# Patient Record
Sex: MALE
[De-identification: names, ages, dates, MRNs, and addresses within clinical notes are randomized per-mention and may not be internally consistent; named-entity substitution may affect disease eponyms.]

---

## 2018-05-09 ENCOUNTER — APPOINTMENT (OUTPATIENT)
Dept: CARDIOLOGY | Facility: CLINIC | Age: 78
End: 2018-05-09

## 2018-05-09 VITALS
HEART RATE: 66 BPM | BODY MASS INDEX: 44.32 KG/M2 | SYSTOLIC BLOOD PRESSURE: 113 MMHG | HEIGHT: 65 IN | OXYGEN SATURATION: 96 % | DIASTOLIC BLOOD PRESSURE: 74 MMHG | TEMPERATURE: 98.4 F | WEIGHT: 266 LBS

## 2018-05-09 DIAGNOSIS — Z87.19 PERSONAL HISTORY OF OTHER DISEASES OF THE DIGESTIVE SYSTEM: ICD-10-CM

## 2018-05-09 DIAGNOSIS — Z82.49 FAMILY HISTORY OF ISCHEMIC HEART DISEASE AND OTHER DISEASES OF THE CIRCULATORY SYSTEM: ICD-10-CM

## 2018-05-09 DIAGNOSIS — N40.0 BENIGN PROSTATIC HYPERPLASIA WITHOUT LOWER URINARY TRACT SYMPMS: ICD-10-CM

## 2018-05-09 DIAGNOSIS — E11.9 TYPE 2 DIABETES MELLITUS W/OUT COMPLICATIONS: ICD-10-CM

## 2018-05-09 DIAGNOSIS — Z87.891 PERSONAL HISTORY OF NICOTINE DEPENDENCE: ICD-10-CM

## 2018-05-09 DIAGNOSIS — Z86.39 PERSONAL HISTORY OF OTHER ENDOCRINE, NUTRITIONAL AND METABOLIC DISEASE: ICD-10-CM

## 2018-05-09 DIAGNOSIS — Z86.010 PERSONAL HISTORY OF COLONIC POLYPS: ICD-10-CM

## 2018-05-09 DIAGNOSIS — Z86.79 PERSONAL HISTORY OF OTHER DISEASES OF THE CIRCULATORY SYSTEM: ICD-10-CM

## 2018-05-18 PROBLEM — Z86.79 HISTORY OF HYPERTENSION: Status: RESOLVED | Noted: 2018-05-18 | Resolved: 2018-05-18

## 2018-05-18 PROBLEM — Z86.39 HISTORY OF OBESITY: Status: RESOLVED | Noted: 2018-05-18 | Resolved: 2018-05-18

## 2018-05-18 PROBLEM — Z86.010 HISTORY OF COLONIC POLYPS: Status: RESOLVED | Noted: 2018-05-18 | Resolved: 2018-05-18

## 2018-05-18 PROBLEM — Z87.891 FORMER SMOKER: Status: ACTIVE | Noted: 2018-05-18

## 2018-05-18 PROBLEM — Z82.49 FAMILY HISTORY OF CORONARY ARTERY DISEASE: Status: ACTIVE | Noted: 2018-05-18

## 2018-05-18 PROBLEM — E11.9 TYPE 2 DIABETES MELLITUS: Status: ACTIVE | Noted: 2018-05-18

## 2018-05-18 PROBLEM — Z82.49 FAMILY HISTORY OF VALVULAR HEART DISEASE: Status: ACTIVE | Noted: 2018-05-18

## 2018-05-18 PROBLEM — N40.0 BPH WITHOUT URINARY OBSTRUCTION: Status: RESOLVED | Noted: 2018-05-18 | Resolved: 2018-05-18

## 2018-05-18 PROBLEM — Z86.39 HISTORY OF THYROID NODULE: Status: RESOLVED | Noted: 2018-05-18 | Resolved: 2018-05-18

## 2018-05-22 ENCOUNTER — RX RENEWAL (OUTPATIENT)
Age: 78
End: 2018-05-22

## 2018-05-27 ENCOUNTER — NON-APPOINTMENT (OUTPATIENT)
Age: 78
End: 2018-05-27

## 2018-05-27 PROBLEM — Z87.19 HISTORY OF GASTRITIS: Status: RESOLVED | Noted: 2018-05-27 | Resolved: 2018-05-27

## 2018-06-25 ENCOUNTER — RX RENEWAL (OUTPATIENT)
Age: 78
End: 2018-06-25

## 2018-07-25 ENCOUNTER — RX RENEWAL (OUTPATIENT)
Age: 78
End: 2018-07-25

## 2018-10-17 ENCOUNTER — RX RENEWAL (OUTPATIENT)
Age: 78
End: 2018-10-17

## 2018-11-13 ENCOUNTER — MEDICATION RENEWAL (OUTPATIENT)
Age: 78
End: 2018-11-13

## 2018-11-16 ENCOUNTER — RX RENEWAL (OUTPATIENT)
Age: 78
End: 2018-11-16

## 2018-11-27 ENCOUNTER — RESULT REVIEW (OUTPATIENT)
Age: 78
End: 2018-11-27

## 2018-12-06 ENCOUNTER — APPOINTMENT (OUTPATIENT)
Dept: CARDIOLOGY | Facility: CLINIC | Age: 78
End: 2018-12-06

## 2018-12-19 ENCOUNTER — NON-APPOINTMENT (OUTPATIENT)
Age: 78
End: 2018-12-19

## 2018-12-19 ENCOUNTER — APPOINTMENT (OUTPATIENT)
Dept: CARDIOLOGY | Facility: CLINIC | Age: 78
End: 2018-12-19
Payer: MEDICARE

## 2018-12-19 VITALS
BODY MASS INDEX: 44.32 KG/M2 | TEMPERATURE: 98 F | WEIGHT: 266 LBS | DIASTOLIC BLOOD PRESSURE: 65 MMHG | OXYGEN SATURATION: 96 % | SYSTOLIC BLOOD PRESSURE: 149 MMHG | HEIGHT: 65 IN | HEART RATE: 70 BPM

## 2018-12-19 DIAGNOSIS — Z87.19 PERSONAL HISTORY OF OTHER DISEASES OF THE DIGESTIVE SYSTEM: ICD-10-CM

## 2018-12-19 PROCEDURE — 93000 ELECTROCARDIOGRAM COMPLETE: CPT

## 2018-12-19 PROCEDURE — 99215 OFFICE O/P EST HI 40 MIN: CPT

## 2018-12-19 NOTE — REVIEW OF SYSTEMS
[Feeling Fatigued] : feeling fatigued [Loss Of Hearing] : hearing loss [see HPI] : see HPI [Joint Pain] : joint pain [Negative] : Heme/Lymph

## 2018-12-23 NOTE — DISCUSSION/SUMMARY
[FreeTextEntry1] : Shortness of breath\par The working diagnosis is shortness of breath on exertion due to obesity and deconditioning. The history is consistent with this diagnosis. The differential diagnosis would include other possibilities.The absence of chest pain and normal 12/18 electrocardiogram argue against myocardial ischemia/atherosclerotic heart disease as a cause for the patient's symptoms. The present  physical examination and normal 11/18 chest  x ray eliminate   pulmonary venous congestion/heart failure as an explanation for symptoms of dyspnea. The likelihood pulmonary emboli in the setting of chronic anticoagulation therapy is remote.\par \par I have recommended a followup:\par 1. No further cardiac testing for this problem at this time\par 2. Low-salt low-fat low-cholesterol diabetic diet. Regular aerobic exercise and weight loss\par \par \par \par Atherosclerotic heart disease\par Atherosclerotic heart disease is stable. In 12/96 Naren underwent left circumflex PCTA. In 5/00 D1 required PCTA/stent. In 11/12 a complex RCA stenosis was addressed by rotablade PCTA/AURELIA . In  8/14 dizziness and diaphoresis led to a diagnosis of acute coronary ischemic syndrome. PCTA/AURELIA (overlapping the prior stent and extending into the ostium) was performed for a 60% RCA stenosis. The LAD had a 50% proximal mid and distal lesion. D1 80% ostial stenosis with a 75% in-stent mid lesion. Athough not described presumably the circumflex/M1 was patent. A 5/15 Lexiscan sestamibi study demonstrated only a small area of apical lateral partial ischemia. The resting 12/18 electrocardiogram shows no evidence of myocardial ischemia or infarction. Left ventricle systolic function is preserved as reflected by a left ventricle ejection fraction of 68% on the 8/14 left ventriculogram"low normal" on the 5/15 echocardiogram and 69% on the 5/15 gated sestamibi study. In view of the lack of symptoms, above noninvasive studies and clinical course continued medical management is indicated at this time.\par \par I have recommended the following: \par 1 continue present medical regimen\par 2 risk factor modification\par 3  echocardiogram with next office  evaluation in  6  months.\par \par \par Atrial fibrillation\par The working diagnosis is paroxysmal atrial fibrillation secondary to atherosclerotic-hypertensive heart disease and obesity. Paroxysmal atrial fibrillation was first detected in 12/10 with a ventricular rate of 140 /minute. Intravenous diltiazem restored sinus rhythm. A 12/11 Holter monitor was normal. An elevated "[id number]" score is indicative of an increased risk of systemic/cerebral emboli. Vitamin K antagonist therapy is indicated with a target INR of 2 - 3. Atrial fibrillation is expected to recur while taking the present medical regimen. The goal of treatment is to control the ventricular response and prevention of emboli as noted above.\par \par I have recommended the following:\par 1 continue present medical regimen\par 2 no further cardiac testing for this problem at this time\par 3 target INR 2-3 as discussed above\par \par \par Obesity \par Obesity exacerbates Mr. Maravilla' cardiovascular issues. Today Naren  is 5 foot 5 inches tall and weighs 266 pounds. Diet exercise and weight loss are advised.

## 2018-12-23 NOTE — PHYSICAL EXAM
[Heart Rate And Rhythm] : heart rate and rhythm were normal [Heart Sounds] : normal S1 and S2 [Murmurs] : no murmurs present [Edema] : no peripheral edema present [Bowel Sounds] : normal bowel sounds [Abdomen Soft] : soft [Abdomen Tenderness] : non-tender [Abnormal Walk] : normal gait [Cyanosis, Localized] : no localized cyanosis [] : no rash [Affect] : the affect was normal [Auscultation Breath Sounds / Voice Sounds] : lungs were clear to auscultation bilaterally [FreeTextEntry1] : pleasant

## 2018-12-23 NOTE — HISTORY OF PRESENT ILLNESS
[FreeTextEntry1] : 78-year-old man\par Routine followup\par  Naren complained of shortness of breath on exertion. The symptoms are not progressive or severe. An 11/18 chest x-ray was normal. No chest pain. No orthopnea. No ankle edema.\par \par Mr. Maravilla is accompanied today by his wife

## 2019-03-25 ENCOUNTER — RX RENEWAL (OUTPATIENT)
Age: 79
End: 2019-03-25

## 2019-05-07 ENCOUNTER — RX RENEWAL (OUTPATIENT)
Age: 79
End: 2019-05-07

## 2019-07-01 ENCOUNTER — RX RENEWAL (OUTPATIENT)
Age: 79
End: 2019-07-01

## 2019-07-16 ENCOUNTER — APPOINTMENT (OUTPATIENT)
Dept: CARDIOLOGY | Facility: CLINIC | Age: 79
End: 2019-07-16
Payer: MEDICARE

## 2019-07-16 PROCEDURE — 93306 TTE W/DOPPLER COMPLETE: CPT

## 2019-07-23 ENCOUNTER — APPOINTMENT (OUTPATIENT)
Dept: CARDIOLOGY | Facility: CLINIC | Age: 79
End: 2019-07-23
Payer: MEDICARE

## 2019-07-23 VITALS
OXYGEN SATURATION: 96 % | BODY MASS INDEX: 44.43 KG/M2 | HEART RATE: 63 BPM | SYSTOLIC BLOOD PRESSURE: 140 MMHG | DIASTOLIC BLOOD PRESSURE: 62 MMHG | WEIGHT: 267 LBS

## 2019-07-23 LAB — INR PPP: 1.3 RATIO

## 2019-07-23 PROCEDURE — 85610 PROTHROMBIN TIME: CPT | Mod: QW

## 2019-07-23 PROCEDURE — 93000 ELECTROCARDIOGRAM COMPLETE: CPT

## 2019-07-23 PROCEDURE — 99215 OFFICE O/P EST HI 40 MIN: CPT

## 2019-07-26 ENCOUNTER — NON-APPOINTMENT (OUTPATIENT)
Age: 79
End: 2019-07-26
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
## 2019-07-26 NOTE — PHYSICAL EXAM
[Auscultation Breath Sounds / Voice Sounds] : lungs were clear to auscultation bilaterally [Heart Rate And Rhythm] : heart rate and rhythm were normal [Heart Sounds] : normal S1 and S2 [Murmurs] : no murmurs present [Edema] : no peripheral edema present [Bowel Sounds] : normal bowel sounds [Abdomen Soft] : soft [Abdomen Tenderness] : non-tender [Abnormal Walk] : normal gait [Cyanosis, Localized] : no localized cyanosis [] : no rash [Affect] : the affect was normal [FreeTextEntry1] : pleasant

## 2019-07-26 NOTE — DISCUSSION/SUMMARY
[FreeTextEntry1] : Shortness of breath\par The working diagnosis is shortness of breath on exertion due to obesity and deconditioning. The history is consistent with this diagnosis. The differential diagnosis would include other possibilities.The absence of chest pain and normal 7/19  electrocardiogram argue against myocardial ischemia/atherosclerotic heart disease as a cause for the patient's symptoms. The present  physical examination and normal 11/18 chest  x ray eliminate   pulmonary venous congestion/heart failure as an explanation for symptoms of dyspnea. The likelihood pulmonary emboli in the setting of chronic anticoagulation therapy is remote.\par \par I have recommended a followup:\par 1. No further cardiac testing for this problem at this time\par 2. Low-salt low-fat low-cholesterol diabetic diet. Regular aerobic exercise and weight loss\par \par \par \par Atherosclerotic heart disease\par Atherosclerotic heart disease is stable. In 12/96 Naren underwent left circumflex PCTA. In 5/00 D1 required PCTA/stent. In 11/12 a complex RCA stenosis was addressed by rotablade PCTA/AURELIA . In  8/14 dizziness and diaphoresis led to a diagnosis of acute coronary ischemic syndrome. PCTA/AURELIA (overlapping the prior stent and extending into the ostium) was performed for a 60% RCA stenosis. The LAD had a 50% proximal mid and distal lesion. D1 80% ostial stenosis with a 75% in-stent mid lesion. Athough not described presumably the circumflex/M1 was patent. A 5/15 Lexiscan sestamibi study demonstrated only a small area of apical lateral partial ischemia. The resting 7/19  electrocardiogram is normal with no evidence of myocardial ischemia or infarction. Left ventricle systolic function is preserved as reflected by a left ventricle ejection fraction of 68% on the 8/14 left ventriculogram 69% on the 5/15 gated sestamibi study 60- 65% on the 7/19 echocardiogram. In view of the lack of symptoms, above noninvasive studies and clinical course continued medical management is indicated at this time.\par \par I have recommended the following: \par 1 continue present medical regimen\par 2 risk factor modification\par 3  No further cardiac testing for this problem at this time\par \par \par Atrial fibrillation\par The working diagnosis is paroxysmal atrial fibrillation secondary to atherosclerotic-hypertensive heart disease and obesity. Paroxysmal atrial fibrillation was first detected in 12/10 with a ventricular rate of 140 /minute. Intravenous diltiazem restored sinus rhythm. A 12/11 Holter monitor was normal. An elevated "[id number]" score is indicative of an increased risk of systemic/cerebral emboli. Vitamin K antagonist therapy is indicated with a target INR of 2 - 3. Atrial fibrillation is expected to recur while taking the present medical regimen. The goal of treatment is to control the ventricular response and prevention of emboli as noted above.\par \par I have recommended the following:\par 1 continue present medical regimen\par 2 no further cardiac testing for this problem at this time\par 3 target INR 2-3 as discussed above\par \par Hypertension\par Hypertension has reportedly been controlled on the present medical regimen. In the setting of diabetes, atrial fibrillation and atherosclerotic heart disease ACE-I and beta blocker therapy are attractive. Nonpharmacological therapy specifically diet exercise and weight loss are emphasized as major aspects of treatment.\par \par I have recommended the following:\par 1 low-salt low-fat low-cholesterol diabetic diet. Regular aerobic exercise and weight loss\par 2 continue the present medical regimen\par \par Hyperlipidemia\par Hyperlipidemia represents a risk factor for progressive atherosclerotic heart disease. A target LDL level with known atherosclerotic heart disease is about 70. HMG-CoA reductase inhibitor therapy has been effective. In 3/18 the serum cholesterol level was 116 triglycerides 147 LDL 43 and HDL 41. More recent lipid levels are not available for review. Non-pharmacological therapy, specifically diet exercise and weight loss are emphasized as major aspects of treatment.\par \par I have recommended the following:\par 1 low-salt low-fat low-cholesterol diet. Regular aerobic exercise and weight loss\par 2 continue the present medical regimen\par 3 routine laboratory studies including lipid profile through primary care and endocrinology\par 4 target LDL level to about 70 as discussed above.\par \par \par Obesity \par Obesity exacerbates Mr. Maravilla' cardiovascular issues. Today Naren  is 5 foot 5 inches tall and weighs 267 pounds. Diet exercise and weight loss are advised.

## 2019-08-25 ENCOUNTER — RX RENEWAL (OUTPATIENT)
Age: 79
End: 2019-08-25

## 2019-10-07 ENCOUNTER — RX RENEWAL (OUTPATIENT)
Age: 79
End: 2019-10-07

## 2020-02-10 ENCOUNTER — RX RENEWAL (OUTPATIENT)
Age: 80
End: 2020-02-10

## 2020-02-10 DIAGNOSIS — N40.0 BENIGN PROSTATIC HYPERPLASIA WITHOUT LOWER URINARY TRACT SYMPMS: ICD-10-CM

## 2020-03-17 ENCOUNTER — APPOINTMENT (OUTPATIENT)
Dept: CARDIOLOGY | Facility: CLINIC | Age: 80
End: 2020-03-17

## 2020-04-28 ENCOUNTER — APPOINTMENT (OUTPATIENT)
Dept: CARDIOLOGY | Facility: CLINIC | Age: 80
End: 2020-04-28

## 2020-07-20 ENCOUNTER — RX RENEWAL (OUTPATIENT)
Age: 80
End: 2020-07-20

## 2020-10-13 ENCOUNTER — RX RENEWAL (OUTPATIENT)
Age: 80
End: 2020-10-13

## 2020-10-22 ENCOUNTER — APPOINTMENT (OUTPATIENT)
Dept: CARDIOLOGY | Facility: CLINIC | Age: 80
End: 2020-10-22

## 2020-10-30 ENCOUNTER — NON-APPOINTMENT (OUTPATIENT)
Age: 80
End: 2020-10-30

## 2020-10-30 ENCOUNTER — APPOINTMENT (OUTPATIENT)
Dept: CARDIOLOGY | Facility: CLINIC | Age: 80
End: 2020-10-30
Payer: MEDICARE

## 2020-10-30 VITALS
OXYGEN SATURATION: 98 % | SYSTOLIC BLOOD PRESSURE: 118 MMHG | BODY MASS INDEX: 45.26 KG/M2 | TEMPERATURE: 98.1 F | HEART RATE: 77 BPM | WEIGHT: 272 LBS | DIASTOLIC BLOOD PRESSURE: 60 MMHG

## 2020-10-30 LAB — INR PPP: 2 RATIO

## 2020-10-30 PROCEDURE — 85610 PROTHROMBIN TIME: CPT | Mod: QW

## 2020-10-30 PROCEDURE — 93000 ELECTROCARDIOGRAM COMPLETE: CPT

## 2020-10-30 PROCEDURE — 99215 OFFICE O/P EST HI 40 MIN: CPT

## 2020-10-30 NOTE — DISCUSSION/SUMMARY
[FreeTextEntry1] : Shortness of breath\par The working diagnosis is shortness of breath on exertion due to obesity and deconditioning. The history is consistent with this diagnosis. The differential diagnosis would include other possibilities.The absence of chest pain and normal  10/20  electrocardiogram argue against myocardial ischemia/atherosclerotic heart disease as a cause for the patient's symptoms. The present  physical examination argues against   pulmonary venous congestion/heart failure as an explanation for symptoms of dyspnea.  Left ventricular systolic function is normal as reflected by left ventricular ejection fraction of 60-65% on the 7/19 echocardiogram  The likelihood pulmonary emboli in the setting of chronic anticoagulation therapy is remote.\par \par I have recommended a followup:\par 1. No further cardiac testing for this problem at this time\par 2. Low-salt low-fat low-cholesterol diabetic diet. Regular aerobic exercise and weight loss\par 3. Most recent medical/hospital records  x ray reports , etc not available at this time are requested for review\par \par \par \par Atherosclerotic heart disease\par Atherosclerotic heart disease is stable. In 12/96 Naren underwent left circumflex PCTA. In 5/00 D1 required PCTA/stent. In 11/12 a complex RCA stenosis was addressed by rotablade PCTA/AURELIA . In  8/14 dizziness and diaphoresis led to a diagnosis of acute coronary ischemic syndrome. PCTA/AURELIA (overlapping the prior stent and extending into the ostium) was performed for a 60% RCA stenosis. The LAD had a 50% proximal mid and distal lesion. D1 80% ostial stenosis with a 75% in-stent mid lesion. Athough not described presumably the circumflex/M1 was patent. A 5/15 Lexiscan sestamibi study demonstrated only a small area of apical lateral partial ischemia. The resting 10/20   electrocardiogram is normal with no evidence of myocardial ischemia or infarction. Left ventricle systolic function is preserved as reflected by a left ventricle ejection fraction of 68% on the 8/14 left ventriculogram 69% on the 5/15 gated sestamibi study 60- 65% on the 7/19 echocardiogram. In view of the lack of symptoms, above noninvasive studies and clinical course continued medical management is indicated at this time.\par \par I have recommended the following: \par 1 continue present medical regimen\par 2 risk factor modification\par 3  No further cardiac testing for this problem at this time\par \par \par Atrial fibrillation\par The working diagnosis is paroxysmal atrial fibrillation secondary to atherosclerotic-hypertensive heart disease and obesity. Paroxysmal atrial fibrillation was first detected in 12/10 with a ventricular rate of 140 /minute. Intravenous diltiazem restored sinus rhythm. A 12/11 Holter monitor was normal. An elevated "[id number]" score is indicative of an increased risk of systemic/cerebral emboli. Vitamin K antagonist therapy is indicated with a target INR of 2 - 3. Atrial fibrillation is expected to recur while taking the present medical regimen. The goal of treatment is to control the ventricular response and prevention of emboli as noted above.\par \par I have recommended the following:\par 1 continue present medical regimen\par 2 no further cardiac testing for this problem at this time\par 3 target INR 2-3 as discussed above\par \par \par \par \par Hypertension\par Hypertension has  been controlled on the present medical regimen. In the setting of diabetes, atrial fibrillation and atherosclerotic heart disease ACE-I and beta blocker therapy are attractive. Nonpharmacological therapy specifically diet exercise and weight loss are emphasized as major aspects of treatment.\par \par I have recommended the following:\par 1 low-salt low-fat low-cholesterol diabetic diet. Regular aerobic exercise and weight loss\par 2 continue the present medical regimen\par \par \par \par \par \par Hyperlipidemia\par Hyperlipidemia represents a risk factor for progressive atherosclerotic heart disease. A target LDL level with known atherosclerotic heart disease is about 70. HMG-CoA reductase inhibitor therapy has been effective. In 3/18 the serum cholesterol level was 116 triglycerides 147 LDL 43 and HDL 41. More recent lipid levels are not available for review. Non-pharmacological therapy, specifically diet exercise and weight loss are emphasized as major aspects of treatment.\par \par I have recommended the following:\par 1 low-salt low-fat low-cholesterol diet. Regular aerobic exercise and weight loss\par 2 continue the present medical regimen\par 3 routine laboratory studies including lipid profile through primary care and endocrinology\par 4 target LDL level to about 70 as discussed above.\par \par \par Obesity \par Obesity exacerbates Mr. Maravilla' cardiovascular issues. Today Naren  is 5 foot 5 inches tall and weighs 272 pounds.  He has gained 5 pounds in the last 15 months  Diet exercise and weight loss are advised.\par \par \par \par The diagnosis, prognosis, risks options and alternatives were explained at length to the patient. All questions are answered. Issues discussed included atherosclerotic heart disease, atrial fibrillation anticoagulation shortness of breath oxygen levels noninvasive cardiac testing diet and exercise/weight loss.\par \par \par

## 2020-10-30 NOTE — HISTORY OF PRESENT ILLNESS
[FreeTextEntry1] : 80-year-old man\par Unanticipated visit\par \sommer Sneed reports being hospitalized at [facility] for a urinary tract infection. He was in requirements for supplemental oxygen during the hospitalization for unclear reasons.  The infection was successfully treated with antibiotics. The details of the hospitalization/medical records are not available at this time for review.\par \par \par Mr. Maravilla experiences dyspnea on exertion similar to that which has occurred in the past. He denies chest pain ankle edema orthopnea palpitations or syncope.\par \sommer Sneed is accompanied today by his wife.

## 2020-11-08 DIAGNOSIS — Z87.440 PERSONAL HISTORY OF URINARY (TRACT) INFECTIONS: ICD-10-CM

## 2021-01-04 ENCOUNTER — APPOINTMENT (OUTPATIENT)
Dept: CARDIOLOGY | Facility: CLINIC | Age: 81
End: 2021-01-04
Payer: MEDICARE

## 2021-01-04 PROCEDURE — 99441: CPT | Mod: 95

## 2021-01-21 ENCOUNTER — APPOINTMENT (OUTPATIENT)
Dept: CARDIOLOGY | Facility: CLINIC | Age: 81
End: 2021-01-21
Payer: MEDICARE

## 2021-01-21 LAB — INR PPP: 3.1 RATIO

## 2021-01-21 PROCEDURE — 85610 PROTHROMBIN TIME: CPT | Mod: QW

## 2021-01-22 ENCOUNTER — APPOINTMENT (OUTPATIENT)
Dept: CARDIOLOGY | Facility: CLINIC | Age: 81
End: 2021-01-22

## 2021-01-26 ENCOUNTER — RX RENEWAL (OUTPATIENT)
Age: 81
End: 2021-01-26

## 2021-02-01 ENCOUNTER — APPOINTMENT (OUTPATIENT)
Dept: CARDIOLOGY | Facility: CLINIC | Age: 81
End: 2021-02-01

## 2021-02-28 RX ORDER — CHOLECALCIFEROL (VITAMIN D3) 25 MCG
TABLET ORAL
Refills: 0 | Status: ACTIVE | COMMUNITY

## 2021-03-03 ENCOUNTER — NON-APPOINTMENT (OUTPATIENT)
Age: 81
End: 2021-03-03

## 2021-03-03 ENCOUNTER — APPOINTMENT (OUTPATIENT)
Dept: CARDIOLOGY | Facility: CLINIC | Age: 81
End: 2021-03-03
Payer: MEDICARE

## 2021-03-03 VITALS
OXYGEN SATURATION: 99 % | DIASTOLIC BLOOD PRESSURE: 70 MMHG | SYSTOLIC BLOOD PRESSURE: 119 MMHG | HEART RATE: 77 BPM | TEMPERATURE: 97.7 F | WEIGHT: 252.38 LBS | BODY MASS INDEX: 42 KG/M2

## 2021-03-03 DIAGNOSIS — Z86.39 PERSONAL HISTORY OF OTHER ENDOCRINE, NUTRITIONAL AND METABOLIC DISEASE: ICD-10-CM

## 2021-03-03 DIAGNOSIS — U07.1 COVID-19: ICD-10-CM

## 2021-03-03 LAB — INR PPP: 1.8 RATIO

## 2021-03-03 PROCEDURE — 93000 ELECTROCARDIOGRAM COMPLETE: CPT | Mod: 59

## 2021-03-03 PROCEDURE — 85610 PROTHROMBIN TIME: CPT | Mod: QW

## 2021-03-03 PROCEDURE — 93246 EXT ECG>7D<15D RECORDING: CPT

## 2021-03-03 PROCEDURE — 99215 OFFICE O/P EST HI 40 MIN: CPT | Mod: 25

## 2021-03-05 PROBLEM — U07.1 COVID-19 VIRUS INFECTION: Status: RESOLVED | Noted: 2021-03-05 | Resolved: 2021-03-05

## 2021-03-06 NOTE — PHYSICAL EXAM
[Auscultation Breath Sounds / Voice Sounds] : lungs were clear to auscultation bilaterally [Heart Rate And Rhythm] : heart rate and rhythm were normal [Heart Sounds] : normal S1 and S2 [Murmurs] : no murmurs present [Bowel Sounds] : normal bowel sounds [Abdomen Soft] : soft [Abdomen Tenderness] : non-tender [Abnormal Walk] : normal gait [Cyanosis, Localized] : no localized cyanosis [] : no rash [Affect] : the affect was normal [FreeTextEntry1] : pleasant

## 2021-03-06 NOTE — DISCUSSION/SUMMARY
[FreeTextEntry1] : Atherosclerotic heart disease\par Atherosclerotic heart disease is stable. In 12/96 Naren underwent left circumflex PCTA. In 5/00 D1 required PCTA/stent. In 11/12 a complex RCA stenosis was addressed by rotablade PCTA/AURELIA . In  8/14 dizziness and diaphoresis led to a diagnosis of acute coronary ischemic syndrome. PCTA/AURELIA (overlapping the prior stent and extending into the ostium) was performed for a 60% RCA stenosis. The LAD had a 50% proximal mid and distal lesion. D1 80% ostial stenosis with a 75% in-stent mid lesion. \par \par In in 2/21 he presented with atrial fibrillation and a rapid ventricular response 120/minute. He was diagnosed as having a non-Q wave myocardial infarction with mild elevation of troponin levels\par ( troponin 0.36 -  0.3 -  0.13.)  Coronary arteriography showed  LAD nonobstructive. D2 80% mid stenosis. Circumflex small nonobstructive mild luminal irregularities. RCA 90% in-stent restenosis However flow measurements demonstrated adequate coronary blood flow.   No revascularization procedure was performed.\par \par  The resting  3/21   electrocardiogram is normal with no evidence of myocardial ischemia or infarction. Left ventricle systolic function is preserved as reflected by a left ventricle ejection fraction of 68% on the 8/14 left ventriculogram 69% on the 5/15 gated sestamibi study 60% on the 2/21  echocardiogram.\par \par I have recommended the following: \par 1  risk factor modification\par 2  The 2 [facility] coronary angiographic studies are requested for formal review. \par \par \par \par Atrial fibrillation\par The working diagnosis is paroxysmal atrial fibrillation secondary to atherosclerotic-hypertensive heart disease and obesity. Paroxysmal atrial fibrillation was first detected in 12/10 with a ventricular rate of 140 /minute. Intravenous diltiazem restored sinus rhythm.\par \par In 2/21 atrial fibrillation with a rapid ventricular response lead to hospitalization at [facility]. Increasing AV alessandro blocking agents including metoprolol succinate 200 mg b.i.d. and diltiazem 120 mg/day were required to control the ventricular response. Atenolol may provide more effective AV alessandro blockade than  metoprolol. The dose of diltiazem may be increased if required to control the ventricular response.\par n elevated "[id number]" score is indicative of an increased risk of systemic/cerebral emboli. Vitamin K antagonist therapy is indicated with a target INR of 2 - 3.. Alternatively factor Xa inhibitor therapy may replace warfarin\par   The goal of treatment is to control the ventricular response and prevention of emboli as noted above.\par \par I have recommended the following:\par 1 Discontinue metoprolol succinate\par 2. Atenolol 50 mg b.i.d.\par 3  Warfarin dose adjustment as required to  target INR 2-3 as discussed above\par 4. Zio patch /mobile telemetry  2 week study \par \par \par \par \par Hypertension\par Hypertension has  been controlled on the present medical regimen. In the setting of diabetes, atrial fibrillation and atherosclerotic heart disease ACE-I and beta blocker therapy are attractive. Nonpharmacological therapy specifically diet exercise and weight loss are emphasized as major aspects of treatment.\par \par I have recommended the following:\par 1 low-salt low-fat low-cholesterol diabetic diet. Regular aerobic exercise and weight loss\par 2 Restart ramipril 10 mg/day with monitoring of electrolytes and renal function\par \par \par \par \par \par Hyperlipidemia\par Hyperlipidemia represents a risk factor for progressive atherosclerotic heart disease. A target LDL level with known atherosclerotic heart disease is about 70. HMG-CoA reductase inhibitor therapy has been effective. In 2/21  the serum cholesterol level was 100  triglycerides 131  LDL 40 and HDL 34 . Non-pharmacological therapy, specifically diet exercise and weight loss are emphasized as major aspects of treatment.\par \par I have recommended the following:\par 1 low-salt low-fat low-cholesterol diet. Regular aerobic exercise and weight loss\par 2 continue the present medical regimen\par 3 routine laboratory studies including lipid profile through primary care and endocrinology\par 4 target LDL level to about 70 as discussed above.\par \par \par Obesity \par Obesity exacerbates Mr. Maravilla' cardiovascular issues. Today Naren  is 5 foot 5 inches tall and weighs 252 pounds.  He has lost 20 pounds in the last 5 months. Continued diet exercise and weight loss are advised.\par \par \par \par The diagnosis, prognosis, risks options and alternatives were explained at length to the patient. All questions are answered. Issues discussed included atherosclerotic heart disease, atrial fibrillation anticoagulation shortness of breath ,  coronary arteriographic findings revascularization therapy noninvasive cardiac testing diet exercise and weight loss .\par \par \par Counseling and/or coordination of care\par Time was a significant factor for this patient encounter. Total time spent with the patient and family was 40 minutes. 50% of the time was devoted to counseling and/or coordination of care \par \par

## 2021-03-06 NOTE — HISTORY OF PRESENT ILLNESS
[FreeTextEntry1] : 80-year-old man\par Routine followup out  of hospital\par \par \sommer Sneed was hospitalized at [facility] with atrial fibrillation and a rapid ventricular response. Mild elevation of the troponin level lead to coronary arteriography. 2 diagnostic angiographic studies were performed. No intervention/revascularization was felt to be required. (See hospital records/angiographic reports.)  He is questioning why no angioplasty/stent was  not  performed\par \par Presently Mr. Maravilla complains of dyspnea on minimal exertion. No chest pain. No palpitations. No syncope.\par \sommer Sneed is accompanied today by his daughter.\par

## 2021-03-17 ENCOUNTER — APPOINTMENT (OUTPATIENT)
Dept: CARDIOLOGY | Facility: CLINIC | Age: 81
End: 2021-03-17
Payer: MEDICARE

## 2021-03-17 LAB — INR PPP: 2.7 RATIO

## 2021-03-17 PROCEDURE — 85610 PROTHROMBIN TIME: CPT | Mod: QW

## 2021-03-24 ENCOUNTER — APPOINTMENT (OUTPATIENT)
Dept: CARDIOLOGY | Facility: CLINIC | Age: 81
End: 2021-03-24
Payer: MEDICARE

## 2021-03-24 PROCEDURE — 99441: CPT | Mod: 95

## 2021-03-30 ENCOUNTER — APPOINTMENT (OUTPATIENT)
Dept: CARDIOLOGY | Facility: CLINIC | Age: 81
End: 2021-03-30
Payer: MEDICARE

## 2021-03-30 LAB — INR PPP: 1.9 RATIO

## 2021-03-30 PROCEDURE — 36415 COLL VENOUS BLD VENIPUNCTURE: CPT

## 2021-03-30 PROCEDURE — 85610 PROTHROMBIN TIME: CPT | Mod: QW

## 2021-03-30 PROCEDURE — 93248 EXT ECG>7D<15D REV&INTERPJ: CPT

## 2021-03-31 LAB
ANION GAP SERPL CALC-SCNC: 15 MMOL/L
BASOPHILS # BLD AUTO: 0.06 K/UL
BASOPHILS NFR BLD AUTO: 1 %
BUN SERPL-MCNC: 33 MG/DL
CALCIUM SERPL-MCNC: 9 MG/DL
CHLORIDE SERPL-SCNC: 103 MMOL/L
CHOLEST SERPL-MCNC: 121 MG/DL
CO2 SERPL-SCNC: 24 MMOL/L
CREAT SERPL-MCNC: 1.33 MG/DL
EOSINOPHIL # BLD AUTO: 0.35 K/UL
EOSINOPHIL NFR BLD AUTO: 5.7 %
GLUCOSE SERPL-MCNC: 121 MG/DL
HCT VFR BLD CALC: 38.6 %
HDLC SERPL-MCNC: 44 MG/DL
HGB BLD-MCNC: 11.4 G/DL
IMM GRANULOCYTES NFR BLD AUTO: 0.3 %
INR PPP: 1.98 RATIO
LDLC SERPL CALC-MCNC: 47 MG/DL
LYMPHOCYTES # BLD AUTO: 1.7 K/UL
LYMPHOCYTES NFR BLD AUTO: 27.8 %
MAN DIFF?: NORMAL
MCHC RBC-ENTMCNC: 27.5 PG
MCHC RBC-ENTMCNC: 29.5 GM/DL
MCV RBC AUTO: 93 FL
MONOCYTES # BLD AUTO: 0.58 K/UL
MONOCYTES NFR BLD AUTO: 9.5 %
NEUTROPHILS # BLD AUTO: 3.41 K/UL
NEUTROPHILS NFR BLD AUTO: 55.7 %
NONHDLC SERPL-MCNC: 77 MG/DL
PLATELET # BLD AUTO: 262 K/UL
POTASSIUM SERPL-SCNC: 4.5 MMOL/L
PT BLD: 22.6 SEC
RBC # BLD: 4.15 M/UL
RBC # FLD: 15.5 %
SODIUM SERPL-SCNC: 141 MMOL/L
TRIGL SERPL-MCNC: 150 MG/DL
WBC # FLD AUTO: 6.12 K/UL

## 2021-04-07 ENCOUNTER — APPOINTMENT (OUTPATIENT)
Dept: CARDIOLOGY | Facility: CLINIC | Age: 81
End: 2021-04-07
Payer: MEDICARE

## 2021-04-07 VITALS
TEMPERATURE: 97.9 F | BODY MASS INDEX: 42.93 KG/M2 | HEART RATE: 68 BPM | OXYGEN SATURATION: 98 % | DIASTOLIC BLOOD PRESSURE: 50 MMHG | WEIGHT: 258 LBS | SYSTOLIC BLOOD PRESSURE: 90 MMHG

## 2021-04-07 DIAGNOSIS — R80.9 PROTEINURIA, UNSPECIFIED: ICD-10-CM

## 2021-04-07 PROCEDURE — 99215 OFFICE O/P EST HI 40 MIN: CPT

## 2021-04-10 PROBLEM — R80.9 PROTEINURIA: Status: RESOLVED | Noted: 2018-05-18 | Resolved: 2021-04-10

## 2021-04-10 NOTE — HISTORY OF PRESENT ILLNESS
[FreeTextEntry1] : 81-year-old man\par Routine followup\par "I feel okay."  Naren denies chest pain, shortness of breath, palpitations or syncope. He is accompanied today by his wife

## 2021-04-10 NOTE — DISCUSSION/SUMMARY
[FreeTextEntry1] : Atherosclerotic heart disease\par Atherosclerotic heart disease is stable. In 12/96 Naren underwent left circumflex PCTA. In 5/00 D1 required PCTA/stent. In 11/12 a complex RCA stenosis was addressed by rotablade PCTA/AURELIA . In  8/14 dizziness and diaphoresis led to a diagnosis of acute coronary ischemic syndrome. PCTA/AURELIA (overlapping the prior stent and extending into the ostium) was performed for a 60% RCA stenosis. The LAD had a 50% proximal mid and distal lesion. D1 80% ostial stenosis with a 75% in-stent mid lesion. \par \par In in 2/21 he presented with atrial fibrillation and a rapid ventricular response 120/minute. He was diagnosed as having a non-Q wave myocardial infarction with mild elevation of troponin levels\par ( troponin 0.36 -  0.3 -  0.13.)  Coronary arteriography showed  LAD nonobstructive. D2 80% mid stenosis. Circumflex small nonobstructive mild luminal irregularities. RCA 90% in-stent restenosis However flow measurements demonstrated adequate coronary blood flow.   No revascularization procedure was performed.\par \par  The resting  3/21   electrocardiogram is normal with no evidence of myocardial ischemia or infarction. Left ventricle systolic function is preserved as reflected by a left ventricle ejection fraction of 68% on the 8/14 left ventriculogram 69% on the 5/15 gated sestamibi study 60% on the 2/21  echocardiogram.\par \par I have recommended the following: \par 1  risk factor modification\par 2  .Continue the present medical regimen\par 3  No further cardiac testing  for this problem at this time\par 4. anticipate echocardiographic study later 2021 or  2022\par \par \par \par Atrial fibrillation\par The working diagnosis is paroxysmal atrial fibrillation secondary to atherosclerotic-hypertensive heart disease and obesity. Paroxysmal atrial fibrillation was first detected in 12/10 with a ventricular rate of 140 /minute. Intravenous diltiazem restored sinus rhythm.\par \par In 2/21 atrial fibrillation with a rapid ventricular response lead to hospitalization at [facility]. Increasing AV alessandro blocking agents including metoprolol succinate 200 mg b.i.d. and diltiazem 120 mg/day were required to control the ventricular response. .In 3/21 atenolol replaced metoprolol.\par  A 3/21  Zio patch 2 week mobile telemetry setting while taking atenolol 50 mg b.i.d. and diltiazem  mg/day revealed sinus rhythm with rate variation /minute. No arrhythmia was noted.\par  The dose of diltiazem may be increased if required to control the ventricular response.\par \par An elevated "[id number]" score is indicative of an increased risk of systemic/cerebral emboli. Vitamin K antagonist therapy is indicated with a target INR of 2 - 3.. Alternatively factor Xa inhibitor therapy may replace warfarin but is influenced by cost\par   The goal of treatment is to control the ventricular response and prevention of emboli as noted above.\par \par I have recommended the following:\par 1 .Continue the present medical regimen\par 2  Warfarin dose adjustment as required to  target INR 2-3 as discussed above\par 3  No further cardiac testing for this problem at this time\par 4  Consideration for apixaban to replace warfarin as discussed above\par \par \par \par \par Hypertension\par Hypertension has  been controlled on the present medical regimen. In the setting of diabetes, atrial fibrillation and atherosclerotic heart disease ACE-I and beta blocker therapy are attractive. Nonpharmacological therapy specifically diet exercise and weight loss are emphasized as major aspects of treatment.\par \par I have recommended the following:\par 1 low-salt low-fat low-cholesterol diabetic diet. Regular aerobic exercise and weight loss\par 2 Continue  the present medical regimen\par \par \par \par \par \par Hyperlipidemia\par Hyperlipidemia represents a risk factor for progressive atherosclerotic heart disease. A target LDL level with known atherosclerotic heart disease is about 70. HMG-CoA reductase inhibitor therapy has been effective. In  3/21   the serum cholesterol level was 121   triglycerides 150   LDL 47  and HDL 44 .\par Consideration  will be given towards a reduction in dose of rosuvastatin dependent on followup lipid levels  Non-pharmacological therapy, specifically diet exercise and weight loss are emphasized as major aspects of treatment.\par \par I have recommended the following:\par 1 low-salt low-fat low-cholesterol diet. Regular aerobic exercise and weight loss\par 2 continue the present medical regimen\par 3 routine laboratory studies including lipid profile through primary care and endocrinology\par 4 target LDL level to about 70 as discussed above.\par 5 Consideration for a reduction in the dose of rosuvastatin  dependent on followup lipid levels as discussed above\par \par Obesity \par Obesity exacerbates Mr. Maravilla' cardiovascular issues. Today Naren  is 5 foot 5 inches tall and weighs 2528 pounds.  He has gained 6 pounds in the last 6 weeks. Diet exercise and weight loss are advised.\par \par \par \par The diagnosis, prognosis, risks options and alternatives were explained at length to the patient and family. All questions are answered. Issues discussed included atherosclerotic heart disease, atrial fibrillation anticoagulation shortness of breath ,  coronary arteriographic findings revascularization therapy noninvasive cardiac testing diet exercise and weight loss .\par \par \par Counseling and/or coordination of care\par Time was a significant factor for this patient encounter. Total time spent with the patient and family was 40 minutes. 50% of the time was devoted to counseling and/or coordination of care \par \par

## 2021-04-21 ENCOUNTER — APPOINTMENT (OUTPATIENT)
Dept: CARDIOLOGY | Facility: CLINIC | Age: 81
End: 2021-04-21
Payer: MEDICARE

## 2021-04-21 LAB — INR PPP: 2.9 RATIO

## 2021-04-21 PROCEDURE — 85610 PROTHROMBIN TIME: CPT | Mod: QW

## 2021-04-30 ENCOUNTER — APPOINTMENT (OUTPATIENT)
Dept: CARDIOLOGY | Facility: CLINIC | Age: 81
End: 2021-04-30

## 2021-05-21 ENCOUNTER — APPOINTMENT (OUTPATIENT)
Dept: CARDIOLOGY | Facility: CLINIC | Age: 81
End: 2021-05-21
Payer: MEDICARE

## 2021-05-21 LAB — INR PPP: 2.4 RATIO

## 2021-05-21 PROCEDURE — 85610 PROTHROMBIN TIME: CPT | Mod: QW

## 2021-06-01 DIAGNOSIS — K21.9 GASTRO-ESOPHAGEAL REFLUX DISEASE W/OUT ESOPHAGITIS: ICD-10-CM

## 2021-06-21 ENCOUNTER — APPOINTMENT (OUTPATIENT)
Dept: CARDIOLOGY | Facility: CLINIC | Age: 81
End: 2021-06-21
Payer: MEDICARE

## 2021-06-21 ENCOUNTER — RESULT CHARGE (OUTPATIENT)
Age: 81
End: 2021-06-21

## 2021-06-21 LAB — INR PPP: 2.1 RATIO

## 2021-06-21 PROCEDURE — 85610 PROTHROMBIN TIME: CPT | Mod: QW

## 2021-07-12 ENCOUNTER — APPOINTMENT (OUTPATIENT)
Dept: CARDIOLOGY | Facility: CLINIC | Age: 81
End: 2021-07-12
Payer: MEDICARE

## 2021-07-12 PROCEDURE — 99441: CPT | Mod: 95

## 2021-07-12 RX ORDER — NITROGLYCERIN 0.4 MG/1
0.4 TABLET SUBLINGUAL
Qty: 60 | Refills: 3 | Status: ACTIVE | COMMUNITY
Start: 2021-07-12 | End: 1900-01-01

## 2021-08-04 ENCOUNTER — RESULT CHARGE (OUTPATIENT)
Age: 81
End: 2021-08-04

## 2021-08-04 ENCOUNTER — APPOINTMENT (OUTPATIENT)
Dept: CARDIOLOGY | Facility: CLINIC | Age: 81
End: 2021-08-04
Payer: MEDICARE

## 2021-08-04 ENCOUNTER — NON-APPOINTMENT (OUTPATIENT)
Age: 81
End: 2021-08-04

## 2021-08-04 VITALS
WEIGHT: 254 LBS | SYSTOLIC BLOOD PRESSURE: 104 MMHG | BODY MASS INDEX: 42.27 KG/M2 | DIASTOLIC BLOOD PRESSURE: 53 MMHG | HEART RATE: 82 BPM | OXYGEN SATURATION: 98 %

## 2021-08-04 DIAGNOSIS — N18.9 CHRONIC KIDNEY DISEASE, UNSPECIFIED: ICD-10-CM

## 2021-08-04 DIAGNOSIS — Z86.79 PERSONAL HISTORY OF OTHER DISEASES OF THE CIRCULATORY SYSTEM: ICD-10-CM

## 2021-08-04 LAB — INR PPP: 1.6 RATIO

## 2021-08-04 PROCEDURE — 93000 ELECTROCARDIOGRAM COMPLETE: CPT

## 2021-08-04 PROCEDURE — 99215 OFFICE O/P EST HI 40 MIN: CPT

## 2021-08-07 ENCOUNTER — NON-APPOINTMENT (OUTPATIENT)
Age: 81
End: 2021-08-07

## 2021-08-07 PROBLEM — N18.9 CHRONIC RENAL INSUFFICIENCY: Status: RESOLVED | Noted: 2020-11-08 | Resolved: 2021-08-07

## 2021-08-07 PROBLEM — Z86.79 HISTORY OF HEART VALVE ABNORMALITY: Status: RESOLVED | Noted: 2018-05-18 | Resolved: 2021-08-07

## 2021-08-07 NOTE — DISCUSSION/SUMMARY
[FreeTextEntry1] : Atherosclerotic heart disease\par Atherosclerotic heart disease is stable. In 12/96 Naren underwent left circumflex PCTA. In 5/00 D1 required PCTA/stent. In 11/12 a complex RCA stenosis was addressed by rotablade PCTA/AURELIA . In  8/14 dizziness and diaphoresis led to a diagnosis of acute coronary ischemic syndrome. PCTA/AUERLIA (overlapping the prior stent and extending into the ostium) was performed for a 60% RCA stenosis. The LAD had a 50% proximal mid and distal lesion. D1 80% ostial stenosis with a 75% in-stent mid lesion. \par \par In in 2/21 he presented with atrial fibrillation and a rapid ventricular response 120/minute. He was diagnosed as having a non-Q wave myocardial infarction with mild elevation of troponin levels\par ( troponin 0.36 -  0.3 -  0.13.)  Coronary arteriography showed  LAD nonobstructive. D2 80% mid stenosis. Circumflex small nonobstructive mild luminal irregularities. RCA 90% in-stent restenosis However flow measurements demonstrated adequate coronary blood flow.   No revascularization procedure was performed.\par \par In 7/21 chest pain lead to a hospitalization at [facility]. Initial ECG suggested minimal ST elevations in the inferior leads. Troponin level normal  < 0.02  Lerxiscan sestamibi study normal perfusion. Left ventricular ejection fraction 72% echocardiogram normal wall motion ejection fraction 65%\par  The resting  8/21   electrocardiogram is normal with no evidence of myocardial ischemia or infarction.\par \par \par \par I have recommended the following: \par 1  risk factor modification\par 2  .Continue the present medical regimen\par 3  No further cardiac testing  for this problem at this time\par \par \par Atrial fibrillation\par The working diagnosis is paroxysmal atrial fibrillation secondary to atherosclerotic-hypertensive heart disease and obesity. Paroxysmal atrial fibrillation was first detected in 12/10 with a ventricular rate of 140 /minute. Intravenous diltiazem restored sinus rhythm.\par \par In 2/21 atrial fibrillation with a rapid ventricular response lead to hospitalization at [facility]. Increasing AV alessandro blocking agents including metoprolol succinate 200 mg b.i.d. and diltiazem 120 mg/day were required to control the ventricular response. .In 3/21 atenolol replaced metoprolol.\par  A 3/21  Zio patch 2 week mobile telemetry setting while taking atenolol 50 mg b.i.d. and diltiazem  mg/day revealed sinus rhythm with rate variation /minute. No arrhythmia was noted.\par  The dose of diltiazem may be increased if required to control the ventricular response.\par \par An elevated "[id number]" score is indicative of an increased risk of systemic/cerebral emboli. Vitamin K antagonist therapy is indicated with a target INR of 2 - 3.. Alternatively factor Xa inhibitor therapy may replace warfarin but is influenced by cost\par   The goal of treatment is to control the ventricular response and prevention of emboli as noted above.\par \par I have recommended the following:\par 1 .Continue the present medical regimen\par 2  Warfarin dose adjustment as required to  target INR 2-3 as discussed above\par 3  No further cardiac testing for this problem at this time\par 4  Consideration for apixaban to replace warfarin as discussed above\par \par \par \par \par Hypertension\par Hypertension has  been controlled on the present medical regimen. In the setting of diabetes, atrial fibrillation and atherosclerotic heart disease ACE-I and beta blocker therapy are attractive. Nonpharmacological therapy specifically diet exercise and weight loss are emphasized as major aspects of treatment.\par \par I have recommended the following:\par 1 low-salt low-fat low-cholesterol diabetic diet. Regular aerobic exercise and weight loss\par 2 Continue  the present medical regimen\par \par \par \par \par \par Hyperlipidemia\par Hyperlipidemia represents a risk factor for progressive atherosclerotic heart disease. A target LDL level with known atherosclerotic heart disease is about 70. HMG-CoA reductase inhibitor therapy has been effective. In  3/21   the serum cholesterol level was 121   triglycerides 150   LDL 47  and HDL 44 .\par Consideration  will be given towards a reduction in dose of rosuvastatin dependent on followup lipid levels  Non-pharmacological therapy, specifically diet exercise and weight loss are emphasized as major aspects of treatment.\par \par I have recommended the following:\par 1 low-salt low-fat low-cholesterol diet. Regular aerobic exercise and weight loss\par 2 continue the present medical regimen\par 3 routine laboratory studies including lipid profile through primary care and endocrinology\par 4 target LDL level to about 70 as discussed above.\par 5 Consideration for a reduction in the dose of rosuvastatin  dependent on followup lipid levels as discussed above\par \par \par \par Obesity \par Obesity exacerbates Mr. Maravilla' cardiovascular issues. Today Naren  is 5 foot 5 inches tall and weighs 254  pounds. . Diet exercise and weight loss are advised.\par \par \par \par The diagnosis, prognosis, risks options and alternatives were explained at length to the patient and family. All questions are answered. Issues discussed included  chest pain atherosclerotic heart disease, atrial fibrillation anticoagulation shortness of breath ,  coronary arteriographic findings revascularization therapy noninvasive cardiac testing diet exercise and weight loss .\par \par \par Counseling and/or coordination of care\par Time was a significant factor for this patient encounter. Total time spent with the patient and family was 40 minutes. 50% of the time was devoted to counseling and/or coordination of care \par \par

## 2021-08-07 NOTE — HISTORY OF PRESENT ILLNESS
[FreeTextEntry1] : 81-year-old man\par Routine followup out of hospital\par 7/21 hospitalization for chest pain headaches or fibrillation. Echocardiogram showed normal left ventricular systolic function and nuclear stress study/Lexiscan study  showed normal perfusion. (See hospital records)\par \par Continues to experience intermittent anterior chest squeezing discomfort at rest.

## 2021-08-07 NOTE — REVIEW OF SYSTEMS
[Feeling Fatigued] : feeling fatigued [Hearing Loss] : hearing loss [Joint Pain] : joint pain [Negative] : Heme/Lymph [FreeTextEntry3] : glasses [FreeTextEntry5] : see history of present illness [FreeTextEntry8] : uti being treated with antibiotics

## 2021-10-19 ENCOUNTER — APPOINTMENT (OUTPATIENT)
Dept: CARDIOLOGY | Facility: CLINIC | Age: 81
End: 2021-10-19

## 2021-10-19 ENCOUNTER — APPOINTMENT (OUTPATIENT)
Dept: CARDIOLOGY | Facility: CLINIC | Age: 81
End: 2021-10-19
Payer: MEDICARE

## 2021-10-19 ENCOUNTER — NON-APPOINTMENT (OUTPATIENT)
Age: 81
End: 2021-10-19

## 2021-10-19 VITALS
RESPIRATION RATE: 12 BRPM | HEART RATE: 83 BPM | BODY MASS INDEX: 42.27 KG/M2 | OXYGEN SATURATION: 99 % | SYSTOLIC BLOOD PRESSURE: 86 MMHG | WEIGHT: 254 LBS | DIASTOLIC BLOOD PRESSURE: 50 MMHG

## 2021-10-19 VITALS
HEART RATE: 60 BPM | HEIGHT: 65 IN | TEMPERATURE: 98.6 F | OXYGEN SATURATION: 99 % | DIASTOLIC BLOOD PRESSURE: 58 MMHG | SYSTOLIC BLOOD PRESSURE: 96 MMHG | BODY MASS INDEX: 42.32 KG/M2 | WEIGHT: 254 LBS

## 2021-10-19 LAB — INR PPP: 2 RATIO

## 2021-10-19 PROCEDURE — 99214 OFFICE O/P EST MOD 30 MIN: CPT

## 2021-10-19 PROCEDURE — 93000 ELECTROCARDIOGRAM COMPLETE: CPT

## 2021-10-19 NOTE — ASSESSMENT
[FreeTextEntry1] : 82 yo male with CAD, prior PCI of D1 2000, PCI of RCA 2012 & 2016, and paroxysmal atrial fibrillation. Patient with brief episode of left upper chest discomfort earlier today, which only lasted for a few seconds before resolving. \par ECG today shows recurrence of atrial fibrillation, but is currently rate controlled.\par \par Patient was found to have low BP of 86/50 today in the office today.\par Patient was advised to discontinue his ramipril 10 mg po daily at this time. \par Will continue atenolol 50 mg po bid and diltiazem  mg po daily at this time. \par Patient was instructed to follow-up with Dr. Bond in 1 week for BP check. \par INR 2.0 today. Will continue current coumadin regimen of 5 mg po daily.\par Low suspicion for ACS as cause of his brief episode of chest pain today. However, patient's low BP is likely a contributing factor in setting of known CAD.

## 2021-10-19 NOTE — CARDIOLOGY SUMMARY
[de-identified] : \par 10/19/21 ECG: Atrial fibrillation, rate 83 bpm, low voltage QRS, poor R wave progression\par  [de-identified] : \par 7/28/21 Lexiscan (at [facility]): Normal rest and stress perfusion imaging. LVEF 72%.\par  [de-identified] : \par 7/28/21 Echo (at [facility]): TDS. Normal LV size and systolic function, LVEF 65%. Mild LAE. Trace MR/TR/GA. PASP 25-30 mmHg.\par  [de-identified] : \par 2/24/21 Cardiac cath (at [facility]): \par Moderate mid LAD stenosis, but not hemodynamically significant per dPR\par Mod in-stent restenosis of prox RCA, but no hemodynamically significant per dPR

## 2021-10-19 NOTE — PHYSICAL EXAM
[No Acute Distress] : no acute distress [Obese] : obese [Normal Conjunctiva] : normal conjunctiva [Normal Venous Pressure] : normal venous pressure [Normal Rate] : normal [Irregularly Irregular] : irregularly irregular [Normal S1] : normal S1 [Normal S2] : normal S2 [S3] : no S3 [S4] : no S4 [No Murmur] : no murmurs heard [No Pitting Edema] : no pitting edema present [Normal] : clear lung fields, good air entry, no respiratory distress [No Edema] : no edema [No Cyanosis] : no cyanosis [No Clubbing] : no clubbing [Alert and Oriented] : alert and oriented [Normal memory] : normal memory

## 2021-10-19 NOTE — HISTORY OF PRESENT ILLNESS
[FreeTextEntry1] : 80 yo male with CAD, prior PCI of D1 2000, PCI of RCA 2012 & 2016, and paroxysmal atrial fibrillation. Patient presents today as walk-in with complaint of left upper chest discomfort earlier today, which only lasted for a few seconds before resolving.

## 2021-10-22 ENCOUNTER — NON-APPOINTMENT (OUTPATIENT)
Age: 81
End: 2021-10-22

## 2021-10-22 ENCOUNTER — APPOINTMENT (OUTPATIENT)
Dept: CARDIOLOGY | Facility: CLINIC | Age: 81
End: 2021-10-22
Payer: MEDICARE

## 2021-10-22 VITALS
DIASTOLIC BLOOD PRESSURE: 60 MMHG | OXYGEN SATURATION: 99 % | HEART RATE: 82 BPM | SYSTOLIC BLOOD PRESSURE: 118 MMHG | WEIGHT: 260 LBS | BODY MASS INDEX: 43.27 KG/M2

## 2021-10-22 PROCEDURE — 93000 ELECTROCARDIOGRAM COMPLETE: CPT

## 2021-10-22 PROCEDURE — 99214 OFFICE O/P EST MOD 30 MIN: CPT

## 2021-10-23 NOTE — REVIEW OF SYSTEMS
[Feeling Fatigued] : feeling fatigued [Joint Pain] : joint pain [Dizziness] : dizziness [Negative] : Psychiatric [FreeTextEntry3] : glasses [FreeTextEntry5] : see history of present illness

## 2021-10-23 NOTE — PHYSICAL EXAM
[Auscultation Breath Sounds / Voice Sounds] : lungs were clear to auscultation bilaterally [Murmurs] : no murmurs present [Bowel Sounds] : normal bowel sounds [Abdomen Soft] : soft [Abdomen Tenderness] : non-tender [Abnormal Walk] : normal gait [Cyanosis, Localized] : no localized cyanosis [] : no rash [FreeTextEntry1] : pleasant [Affect] : the affect was normal

## 2021-10-23 NOTE — DISCUSSION/SUMMARY
[FreeTextEntry1] : Atherosclerotic heart disease\par Atherosclerotic heart disease is stable. In 12/96 Naren underwent left circumflex PCTA. In 5/00 D1 required PCTA/stent. In 11/12 a complex RCA stenosis was addressed by rotablade PCTA/AURELIA . In  8/14 dizziness and diaphoresis led to a diagnosis of acute coronary ischemic syndrome. PCTA/AURELIA (overlapping the prior stent and extending into the ostium) was performed for a 60% RCA stenosis. The LAD had a 50% proximal mid and distal lesion. D1 80% ostial stenosis with a 75% in-stent mid lesion. \par \par In in 2/21 he presented with atrial fibrillation and a rapid ventricular response 120/minute. He was diagnosed as having a non-Q wave myocardial infarction with mild elevation of troponin levels\par ( troponin 0.36 -  0.3 -  0.13.)  Coronary arteriography showed  LAD nonobstructive. D2 80% mid stenosis. Circumflex small nonobstructive mild luminal irregularities. RCA 90% in-stent restenosis However flow measurements demonstrated adequate coronary blood flow.   No revascularization procedure was performed.\par \par In 7/21 chest pain lead to a hospitalization at [facility]. Initial ECG suggested minimal ST elevations in the inferior leads. Troponin level normal  < 0.02  Lerxiscan sestamibi study normal perfusion. Left ventricular ejection fraction 72% echocardiogram normal wall motion ejection fraction 65%\par  The resting   10/21    electrocardiogram  shows no evidence of myocardial ischemia or infarction.\par \par \par \par I have recommended the following: \par 1  risk factor modification\par 2  .Continue the present medical regimen\par 3  No further cardiac testing  for this problem at this time\par \par \par \par \par Atrial fibrillation\par The working diagnosis is paroxysmal atrial fibrillation secondary to atherosclerotic-hypertensive heart disease and obesity. Paroxysmal atrial fibrillation was first detected in 12/10 with a ventricular rate of 140 /minute. Intravenous diltiazem restored sinus rhythm.\par \par In 2/21 atrial fibrillation with a rapid ventricular response lead to hospitalization at [facility]. Increasing AV alessandro blocking agents including metoprolol succinate 200 mg b.i.d. and diltiazem 120 mg/day were required to control the ventricular response. .In 3/21 atenolol replaced metoprolol.\par  A 3/21  Zio patch 2 week mobile telemetry setting while taking atenolol 50 mg b.i.d. and diltiazem  mg/day revealed sinus rhythm with rate variation /minute. No arrhythmia was noted.\par \par The present 10/21 electrocardiogram demonstrates atrial fibrillation with a rapid ventricular response (130/minute) despite the present medical regimen\par  The dose of diltiazem may be increased in an effort to better  control the ventricular response.\par \par An elevated "[id number]" score is indicative of an increased risk of systemic/cerebral emboli. Vitamin K antagonist therapy is indicated with a target INR of 2 - 3.. Alternatively factor Xa inhibitor therapy may replace warfarin but is influenced by cost\par   The goal of treatment is to control the ventricular response and prevention of emboli as noted above.\par \par I have recommended the following:\par 1 Increase diltiazem dose from 120 mg daily to 120 mg b.i.d..\par 2  Warfarin dose adjustment as required to  target INR 2-3 as discussed above\par 3  No further cardiac testing for this problem at this time\par 4  Consideration for apixaban to replace warfarin as discussed above\par \par \par \par \par Hypertension\par Hypertension has  been controlled on the present medical regimen. In the setting of diabetes, atrial fibrillation and atherosclerotic heart disease ACE-I and beta blocker therapy are attractive. \par  In 10/21 Ramipril 10 mg/day was discontinued in response to systemic hypotension (blood pressure 86/56 mmHg)  Nonpharmacological therapy specifically diet exercise and weight loss are emphasized as major aspects of treatment.\par \par I have recommended the following:\par 1 low-salt low-fat low-cholesterol diabetic diet. Regular aerobic exercise and weight loss\par 2. reinstitution of ramipril at a lower dose if  required to maintain optimal levels\par \par \par \par \par \par \par Hyperlipidemia\par Hyperlipidemia represents a risk factor for progressive atherosclerotic heart disease. A target LDL level with known atherosclerotic heart disease is about 70. HMG-CoA reductase inhibitor therapy has been effective. In  3/21   the serum cholesterol level was 121   triglycerides 150   LDL 47  and HDL 44 .\par Consideration  will be given towards a reduction in dose of rosuvastatin dependent on followup lipid levels  Non-pharmacological therapy, specifically diet exercise and weight loss are emphasized as major aspects of treatment.\par \par I have recommended the following:\par 1 low-salt low-fat low-cholesterol diet. Regular aerobic exercise and weight loss\par 2 continue the present medical regimen\par 3 routine laboratory studies including lipid profile through primary care and endocrinology\par 4 target LDL level to about 70 as discussed above.\par 5 Consideration for a reduction in the dose of rosuvastatin  dependent on followup lipid levels as discussed above\par \par \par \par Obesity \par Obesity exacerbates Mr. Maarvilla' cardiovascular issues. Today Naren  is 5 foot 5 inches tall and weighs 260  pounds. .He has gained 4 pounds in the last 6 weeks    Diet exercise and weight loss are advised.\par \par \par \par The diagnosis, prognosis, risks options and alternatives were explained at length to the patient and family. All questions are answered. Issues discussed included   rapid ventricular rates  hypertension/hypotension  chest pain atherosclerotic heart disease, atrial fibrillation anticoagulation ,  coronary arteriographic findings revascularization therapy noninvasive cardiac testing diet exercise and weight loss .\par \par \par Counseling and/or coordination of care\par Time was a significant factor for this patient encounter. Total time spent with the patient and family was 30 minutes. 50% of the time was devoted to counseling and/or coordination of care \par \par

## 2021-10-23 NOTE — HISTORY OF PRESENT ILLNESS
[FreeTextEntry1] : 81-year-old man\par Routine followup \par \par Naren was seen by Dr. Gentile 10/19/21 and found to be  hypotensive( 86/50 mmHg. ) Ramipril was discontinued. Naren complained of dizziness without syncope. No angina.No palpitations.

## 2021-10-28 ENCOUNTER — APPOINTMENT (OUTPATIENT)
Dept: CARDIOLOGY | Facility: CLINIC | Age: 81
End: 2021-10-28

## 2021-11-11 ENCOUNTER — APPOINTMENT (OUTPATIENT)
Dept: CARDIOLOGY | Facility: CLINIC | Age: 81
End: 2021-11-11

## 2021-11-15 ENCOUNTER — APPOINTMENT (OUTPATIENT)
Dept: CARDIOLOGY | Facility: CLINIC | Age: 81
End: 2021-11-15
Payer: MEDICARE

## 2021-11-15 LAB — INR PPP: 1.9 RATIO

## 2021-11-15 PROCEDURE — 85610 PROTHROMBIN TIME: CPT | Mod: QW

## 2021-11-22 ENCOUNTER — APPOINTMENT (OUTPATIENT)
Dept: CARDIOLOGY | Facility: CLINIC | Age: 81
End: 2021-11-22
Payer: MEDICARE

## 2021-11-22 VITALS
BODY MASS INDEX: 44.43 KG/M2 | WEIGHT: 267 LBS | HEART RATE: 90 BPM | OXYGEN SATURATION: 97 % | SYSTOLIC BLOOD PRESSURE: 122 MMHG | DIASTOLIC BLOOD PRESSURE: 67 MMHG

## 2021-11-22 DIAGNOSIS — E66.9 OBESITY, UNSPECIFIED: ICD-10-CM

## 2021-11-22 LAB — INR PPP: 2.5 RATIO

## 2021-11-22 PROCEDURE — 85610 PROTHROMBIN TIME: CPT | Mod: QW

## 2021-11-22 PROCEDURE — 99214 OFFICE O/P EST MOD 30 MIN: CPT

## 2021-11-24 PROBLEM — E66.9 OBESITY: Status: ACTIVE | Noted: 2018-05-18

## 2021-11-24 NOTE — PHYSICAL EXAM
[Auscultation Breath Sounds / Voice Sounds] : lungs were clear to auscultation bilaterally [Murmurs] : no murmurs present [Bowel Sounds] : normal bowel sounds [Abdomen Soft] : soft [Abdomen Tenderness] : non-tender [Abnormal Walk] : normal gait [Cyanosis, Localized] : no localized cyanosis [] : no rash [Affect] : the affect was normal [FreeTextEntry1] : pleasant

## 2021-11-24 NOTE — HISTORY OF PRESENT ILLNESS
[FreeTextEntry1] : 81-year-old man\par Routine followup\par Periodic episodes of chest pain without requirements for nitroglycerin. His symptoms are not progressive or severe or different from in the past. No palpitations. No syncope.

## 2021-11-24 NOTE — DISCUSSION/SUMMARY
[FreeTextEntry1] : Atherosclerotic heart disease\par Atherosclerotic heart disease is stable. In 12/96 Naren underwent left circumflex PCTA. In 5/00 D1 required PCTA/stent. In 11/12 a complex RCA stenosis was addressed by rotablade PCTA/AURELIA . In  8/14 dizziness and diaphoresis led to a diagnosis of acute coronary ischemic syndrome. PCTA/AURELIA (overlapping the prior stent and extending into the ostium) was performed for a 60% RCA stenosis. The LAD had a 50% proximal mid and distal lesion. D1 80% ostial stenosis with a 75% in-stent mid lesion. \par \par In in 2/21 he presented with atrial fibrillation and a rapid ventricular response 120/minute. He was diagnosed as having a non-Q wave myocardial infarction with mild elevation of troponin levels\par ( troponin 0.36 -  0.3 -  0.13.)  Coronary arteriography showed  LAD nonobstructive. D2 80% mid stenosis. Circumflex small nonobstructive mild luminal irregularities. RCA 90% in-stent restenosis However flow measurements demonstrated adequate coronary blood flow.   No revascularization procedure was performed.\par \par In 7/21 chest pain lead to a hospitalization at [facility]. Initial ECG suggested minimal ST elevations in the inferior leads. Troponin level normal  < 0.02  Lexiscan sestamibi study showed  normal perfusion. Left ventricular ejection fraction 72% echocardiogram normal wall motion ejection fraction 65%\par  The resting   10/21    electrocardiogram  shows no evidence of myocardial ischemia or infarction.\par \par \par \par I have recommended the following: \par 1  risk factor modification\par 2  .Continue the present medical regimen\par 3  No further cardiac testing  for this problem at this time\par \par \par \par \par Atrial fibrillation\par The working diagnosis is paroxysmal atrial fibrillation secondary to atherosclerotic-hypertensive heart disease and obesity. Paroxysmal atrial fibrillation was first detected in 12/10 with a ventricular rate of 140 /minute. Intravenous diltiazem restored sinus rhythm.\par \par In 2/21 atrial fibrillation with a rapid ventricular response lead to hospitalization at [facility]. Increasing AV alessandro blocking agents including metoprolol succinate 200 mg b.i.d. and diltiazem 120 mg/day were required to control the ventricular response. .In 3/21 atenolol replaced metoprolol.\par  A 3/21  Zio patch 2 week mobile telemetry setting while taking atenolol 50 mg b.i.d. and diltiazem  mg/day revealed sinus rhythm with rate variation /minute. No arrhythmia was noted.\par \par The  10/21 electrocardiogram demonstrated  atrial fibrillation with a rapid ventricular response (130/minute) . Diltiazem dose was then increased from 120 mg/day  to 120 mg bid\par  .\par \par An elevated "[id number]" score is indicative of an increased risk of systemic/cerebral emboli. Vitamin K antagonist therapy is indicated with a target INR of 2 - 3.. Alternatively factor Xa inhibitor therapy may replace warfarin but is influenced by cost\par   The goal of  the present treatment is to control the ventricular response and prevention of emboli as noted above.\par Consideration will be given towards pulmonary vein isolation/radiofrequency ablation dependent on the patient's clinical course\par \par \par I have recommended the following:\par 1 .Continue the present medical regimen\par 2  Warfarin dose adjustment as required to  target INR 2-3 as discussed above\par 3  No further cardiac testing for this problem at this time\par 4  Consideration for apixaban to replace warfarin as discussed above\par 5  ??pulmonary vein isolation/radiofrequency ablation dependent on the patient's clinical course\par \par \par \par \par Hypertension\par Hypertension has  been controlled on the present medical regimen. In the setting of diabetes, atrial fibrillation and atherosclerotic heart disease ACE-I and beta blocker therapy are attractive. \par  In 10/21 Ramipril 10 mg/day was discontinued in response to systemic hypotension (blood pressure 86/56 mmHg)  Nonpharmacological therapy specifically diet exercise and weight loss are emphasized as major aspects of treatment.\par \par I have recommended the following:\par 1 low-salt low-fat low-cholesterol diabetic diet. Regular aerobic exercise and weight loss\par 2. reinstitution of ramipril at a lower dose if  required to maintain optimal levels\par \par \par \par \par \par \par Hyperlipidemia\par Hyperlipidemia represents a risk factor for progressive atherosclerotic heart disease. A target LDL level with known atherosclerotic heart disease is about 70. HMG-CoA reductase inhibitor therapy has been effective. In  3/21   the serum cholesterol level was 121   triglycerides 150   LDL 47  and HDL 44 .\par Consideration  will be given towards a reduction in dose of rosuvastatin dependent on followup lipid levels  Non-pharmacological therapy, specifically diet exercise and weight loss are emphasized as major aspects of treatment.\par \par I have recommended the following:\par 1 low-salt low-fat low-cholesterol diet. Regular aerobic exercise and weight loss\par 2 continue the present medical regimen\par 3 routine laboratory studies including lipid profile through primary care and endocrinology\par 4 target LDL level to about 70 as discussed above.\par 5 Consideration for a reduction in the dose of rosuvastatin  dependent on followup lipid levels as discussed above\par \par \par \par Obesity \par Obesity exacerbates Mr. Maravilla' cardiovascular issues. Today Naren  is 5 foot 5 inches tall and weighs 267  pounds. .He has gained 11  pounds in the last  2 months    Diet exercise and weight loss are advised.\par \par \par \par The diagnosis, prognosis, risks options and alternatives were explained at length to the patient.. All questions are answered. Issues discussed included   obesity   hypertension/hypotension  chest pain atherosclerotic heart disease, atrial fibrillation anticoagulation ,  coronary arteriographic findings revascularization therapy noninvasive cardiac testing diet exercise and weight loss .\par \par \par Counseling and/or coordination of care\par Time was a significant factor for this patient encounter. Total time spent with the patient as 30 minutes. 50% of the time was devoted to counseling and/or coordination of care \par \par

## 2021-12-15 ENCOUNTER — APPOINTMENT (OUTPATIENT)
Dept: CARDIOLOGY | Facility: CLINIC | Age: 81
End: 2021-12-15
Payer: MEDICARE

## 2021-12-15 PROCEDURE — 99442: CPT | Mod: 95

## 2021-12-16 ENCOUNTER — APPOINTMENT (OUTPATIENT)
Dept: CARDIOLOGY | Facility: CLINIC | Age: 81
End: 2021-12-16
Payer: MEDICARE

## 2021-12-16 DIAGNOSIS — Z00.00 ENCOUNTER FOR GENERAL ADULT MEDICAL EXAMINATION W/OUT ABNORMAL FINDINGS: ICD-10-CM

## 2021-12-16 LAB — INR PPP: 2.5 RATIO

## 2021-12-16 PROCEDURE — 85610 PROTHROMBIN TIME: CPT | Mod: QW

## 2021-12-28 ENCOUNTER — APPOINTMENT (OUTPATIENT)
Dept: CARDIOLOGY | Facility: CLINIC | Age: 81
End: 2021-12-28
Payer: MEDICARE

## 2021-12-28 VITALS
RESPIRATION RATE: 12 BRPM | OXYGEN SATURATION: 98 % | HEART RATE: 83 BPM | WEIGHT: 271 LBS | SYSTOLIC BLOOD PRESSURE: 119 MMHG | DIASTOLIC BLOOD PRESSURE: 72 MMHG | BODY MASS INDEX: 45.1 KG/M2

## 2021-12-28 PROCEDURE — 99214 OFFICE O/P EST MOD 30 MIN: CPT

## 2021-12-28 NOTE — PHYSICAL EXAM
[No Acute Distress] : no acute distress [Obese] : obese [Normal Conjunctiva] : normal conjunctiva [Normal Venous Pressure] : normal venous pressure [Normal Rate] : normal [Irregularly Irregular] : irregularly irregular [Normal S1] : normal S1 [Normal S2] : normal S2 [No Murmur] : no murmurs heard [___ +] : bilateral [unfilled]U+ pitting edema to the ankles [Normal] : clear lung fields, good air entry, no respiratory distress [No Cyanosis] : no cyanosis [No Clubbing] : no clubbing [Moves all extremities] : moves all extremities [No Focal Deficits] : no focal deficits [Normal Speech] : normal speech [Alert and Oriented] : alert and oriented [Normal memory] : normal memory [S3] : no S3 [S4] : no S4

## 2021-12-28 NOTE — ASSESSMENT
[FreeTextEntry1] : 80 yo male with CAD, prior PCI of D1 2000, PCI of RCA 2012 & 2016, and paroxysmal atrial fibrillation. Patient with pedal edema over the past 3 weeks.\par Last echo on 7/28/21 (at [facility]) reported normal LV size and systolic function, LVEF 65%, mild LAE, trace MR/TR/IA, and PASP 25-30 mmHg.\par Lexiscan nuclear stress tera at [facility] on 7/28/21 reported normal rest and stress perfusion imaging. LVEF 72%.\par \par Patient's diltiazem may be contributing factor for his pedal edema in addition to likely venous insufficiency and obesity. However, given need for afib rate control, will continue diltiazem at this time.\par Will start furosemide 20 mg po daily and monitor response. Patient to keep legs elevated as well when seated of supine. \par RTC in 1 month for follow-up and labs.\par \par Will continue atenolol 50 mg po bid and diltiazem  mg po daily for afib rate control.\par Will continue current coumadin regimen of 5 mg po daily. INR 2.5 on 12/16/21.\par \par Will continue current management of CAD with aspirin, atenolol, and rosuvastatin.\par \par BP is currently adequately controlled. \par Will continue to monitor on current regimen.\par

## 2021-12-28 NOTE — HISTORY OF PRESENT ILLNESS
[FreeTextEntry1] : 80 yo male with CAD, prior PCI of D1 2000, PCI of RCA 2012 & 2016, and paroxysmal atrial fibrillation. Patient presents today for 2nd opinion regarding pedal edema over the past 3 weeks. He also reports intermittent exertional dyspnea, but denies chest pain. Patient denies palpitations, syncope, melena, hematochezia, or hematemesis.

## 2021-12-28 NOTE — CARDIOLOGY SUMMARY
[de-identified] : \par 10/19/21 ECG: Atrial fibrillation, rate 83 bpm, low voltage QRS, poor R wave progression\par   [de-identified] : \par 7/28/21 Lexiscan (at [facility]): Normal rest and stress perfusion imaging. LVEF 72%.\par  [de-identified] : \par 7/28/21 Echo (at [facility]): TDS. Normal LV size and systolic function, LVEF 65%. Mild LAE. Trace MR/TR/DC. PASP 25-30 mmHg.\par  [de-identified] : \par 2/24/21 Cardiac cath (at [facility]): \par Moderate mid LAD stenosis, but not hemodynamically significant per dPR\par Mod in-stent restenosis of prox RCA, but no hemodynamically significant per dPR

## 2022-01-03 ENCOUNTER — APPOINTMENT (OUTPATIENT)
Dept: CARDIOLOGY | Facility: CLINIC | Age: 82
End: 2022-01-03

## 2022-01-21 ENCOUNTER — RESULT CHARGE (OUTPATIENT)
Age: 82
End: 2022-01-21

## 2022-01-21 ENCOUNTER — APPOINTMENT (OUTPATIENT)
Dept: CARDIOLOGY | Facility: CLINIC | Age: 82
End: 2022-01-21
Payer: MEDICARE

## 2022-01-21 LAB — INR PPP: 3 RATIO

## 2022-01-21 PROCEDURE — 85610 PROTHROMBIN TIME: CPT | Mod: QW

## 2022-01-28 ENCOUNTER — APPOINTMENT (OUTPATIENT)
Dept: CARDIOLOGY | Facility: CLINIC | Age: 82
End: 2022-01-28
Payer: MEDICARE

## 2022-01-28 VITALS
OXYGEN SATURATION: 99 % | RESPIRATION RATE: 12 BRPM | HEART RATE: 64 BPM | HEIGHT: 65 IN | DIASTOLIC BLOOD PRESSURE: 55 MMHG | WEIGHT: 260 LBS | BODY MASS INDEX: 43.32 KG/M2 | SYSTOLIC BLOOD PRESSURE: 104 MMHG

## 2022-01-28 PROCEDURE — 99214 OFFICE O/P EST MOD 30 MIN: CPT

## 2022-01-28 PROCEDURE — 93000 ELECTROCARDIOGRAM COMPLETE: CPT

## 2022-01-29 ENCOUNTER — NON-APPOINTMENT (OUTPATIENT)
Age: 82
End: 2022-01-29

## 2022-01-29 NOTE — CARDIOLOGY SUMMARY
[de-identified] : \par 1/28/22 ECG: Atrial fibrillation, rate 69 bpm, poor R wave progession\par 10/19/21 ECG: Atrial fibrillation, rate 83 bpm, low voltage QRS, poor R wave progression\par   [de-identified] : \par 7/28/21 Lexiscan (at [facility]): Normal rest and stress perfusion imaging. LVEF 72%.\par  [de-identified] : \par 7/28/21 Echo (at [facility]): TDS. Normal LV size and systolic function, LVEF 65%. Mild LAE. Trace MR/TR/OR. PASP 25-30 mmHg.\par  [de-identified] : \par 2/24/21 Cardiac cath (at [facility]): \par Moderate mid LAD stenosis, but not hemodynamically significant per dPR\par Mod in-stent restenosis of prox RCA, but no hemodynamically significant per dPR

## 2022-01-29 NOTE — ASSESSMENT
[FreeTextEntry1] : 80 yo male with CAD, prior PCI of D1 2000, PCI of RCA 2012 & 2016, and paroxysmal atrial fibrillation. Patient with pedal edema over the past 3 weeks.\par Last echo on 7/28/21 (at [facility]) reported normal LV size and systolic function, LVEF 65%, mild LAE, trace MR/TR/WI, and PASP 25-30 mmHg.\par Lexiscan nuclear stress tera at [facility] on 7/28/21 reported normal rest and stress perfusion imaging. LVEF 72%.\par \par Afib remains rate controlled per ECG today.\par Will continue atenolol 50 mg po bid and diltiazem  mg po daily for afib rate control.\par Will continue coumadin 5 mg po daily for anticoagulation. INR 3 on 1/21/22.\par  \par Leg edema has resolved with furosemide 20 mg po daily. However, patient with low BP and reported intermittent headaches, which may be due to low BP.\par Will reduce furosemide to 20 mg po every Mon/Wed/Fri.\par Labs from 1/14/22 were reviewed today and reported normal lytes and Cr.\par \par Will continue current management of CAD with aspirin, atenolol, and rosuvastatin.

## 2022-01-29 NOTE — HISTORY OF PRESENT ILLNESS
[FreeTextEntry1] : 80 yo male with CAD, prior PCI of D1 2000, PCI of RCA 2012 & 2016, and paroxysmal atrial fibrillation. Patient presents today for follow-up after starting furosemide 20 mg po daily on 12/28/21 for worsening leg edema. He reports that his leg edema has significantly improved with furosemide. However, he reports occasional headaches in AM. Patient denies chest pain, dyspnea, palpitations, syncope, edema, melena, hematochezia, or hematemesis.

## 2022-01-29 NOTE — PHYSICAL EXAM
[No Acute Distress] : no acute distress [Obese] : obese [Normal Conjunctiva] : normal conjunctiva [Normal Venous Pressure] : normal venous pressure [Normal Rate] : normal [Irregularly Irregular] : irregularly irregular [Normal S1] : normal S1 [Normal S2] : normal S2 [No Murmur] : no murmurs heard [Normal] : clear lung fields, good air entry, no respiratory distress [No Cyanosis] : no cyanosis [No Clubbing] : no clubbing [Moves all extremities] : moves all extremities [No Focal Deficits] : no focal deficits [Normal Speech] : normal speech [Alert and Oriented] : alert and oriented [Normal memory] : normal memory [No Pitting Edema] : no pitting edema present [S3] : no S3 [S4] : no S4

## 2022-03-19 ENCOUNTER — RX RENEWAL (OUTPATIENT)
Age: 82
End: 2022-03-19

## 2022-04-05 ENCOUNTER — RX RENEWAL (OUTPATIENT)
Age: 82
End: 2022-04-05

## 2022-05-02 ENCOUNTER — APPOINTMENT (OUTPATIENT)
Dept: CARDIOLOGY | Facility: CLINIC | Age: 82
End: 2022-05-02
Payer: MEDICARE

## 2022-05-02 ENCOUNTER — NON-APPOINTMENT (OUTPATIENT)
Age: 82
End: 2022-05-02

## 2022-05-02 VITALS
HEART RATE: 84 BPM | WEIGHT: 266 LBS | SYSTOLIC BLOOD PRESSURE: 140 MMHG | OXYGEN SATURATION: 99 % | BODY MASS INDEX: 44.27 KG/M2 | DIASTOLIC BLOOD PRESSURE: 62 MMHG

## 2022-05-02 LAB — INR PPP: 1.9 RATIO

## 2022-05-02 PROCEDURE — 93000 ELECTROCARDIOGRAM COMPLETE: CPT

## 2022-05-02 PROCEDURE — 99214 OFFICE O/P EST MOD 30 MIN: CPT

## 2022-05-02 PROCEDURE — 85610 PROTHROMBIN TIME: CPT | Mod: QW

## 2022-05-02 NOTE — HISTORY OF PRESENT ILLNESS
[FreeTextEntry1] : 81 yo male with CAD, prior PCI of D1 2000, PCI of RCA 2012 & 2016, and paroxysmal atrial fibrillation. Patient presents today for follow-up. He reports that he increased his furosemide to 40 mg po daily due to increasing pedal edema. Patient denies chest pain, dyspnea, palpitations, syncope, melena, hematochezia, or hematemesis.\par \par fter starting furosemide 20 mg po daily on 12/28/21 for worsening leg edema. He reports that his leg edema has significantly improved with furosemide. However, he reports occasional headaches in AM. Patient denies chest pain, dyspnea, palpitations, syncope, edema, melena, hematochezia, or hematemesis.

## 2022-05-02 NOTE — ASSESSMENT
[FreeTextEntry1] : 81 yo male with CAD, prior PCI of D1 2000, PCI of RCA 2012 & 2016, and paroxysmal atrial fibrillation. Patient with pedal edema over the past 3 weeks.\par Last echo on 7/28/21 (at [facility]) reported normal LV size and systolic function, LVEF 65%, mild LAE, trace MR/TR/OK, and PASP 25-30 mmHg.\par Lexiscan nuclear stress tera at [facility] on 7/28/21 reported normal rest and stress perfusion imaging. LVEF 72%.\par \par Afib remains rate controlled per ECG today.\par Will continue atenolol 50 mg po bid and diltiazem  mg po daily for afib rate control.\par  INR 1.90 today. Will continue coumadin 5 mg po daily for anticoagulation. Will repeat INR in 4 weeks.\par  \par Patient with stable pedal edema. Likely due to diltiazem. However, given need for afib rate control and discussion with patient, will continue current regimen. Leg elevation and use of compression stocking was advised.\par Will continue furosemide 40 mg po daily for leg edema management.\par Labs from 4/13/22 were reviewed today and demonstrated normal Cr and lytes. \par \par Will continue current management of CAD with aspirin, atenolol, and rosuvastatin.

## 2022-05-02 NOTE — PHYSICAL EXAM
[No Acute Distress] : no acute distress [Obese] : obese [Normal Conjunctiva] : normal conjunctiva [Normal Venous Pressure] : normal venous pressure [Normal Rate] : normal [Irregularly Irregular] : irregularly irregular [Normal S1] : normal S1 [Normal S2] : normal S2 [No Murmur] : no murmurs heard [No Pitting Edema] : no pitting edema present [Normal] : clear lung fields, good air entry, no respiratory distress [No Cyanosis] : no cyanosis [No Clubbing] : no clubbing [Moves all extremities] : moves all extremities [No Focal Deficits] : no focal deficits [Normal Speech] : normal speech [Alert and Oriented] : alert and oriented [Normal memory] : normal memory [S3] : no S3 [S4] : no S4

## 2022-05-02 NOTE — CARDIOLOGY SUMMARY
[de-identified] : \par 5/2/22 ECG: Atrial fibrillation, rate 73 bpm, low voltage QRS, poor R wave progression\par 1/28/22 ECG: Atrial fibrillation, rate 69 bpm, poor R wave progression\par 10/19/21 ECG: Atrial fibrillation, rate 83 bpm, low voltage QRS, poor R wave progression\par  [de-identified] : \par 7/28/21 Lexiscan (at [facility]): Normal rest and stress perfusion imaging. LVEF 72%.\par  [de-identified] : \par 7/28/21 Echo (at [facility]): TDS. Normal LV size and systolic function, LVEF 65%. Mild LAE. Trace MR/TR/AK. PASP 25-30 mmHg.\par  [de-identified] : \par 2/24/21 Cardiac cath (at [facility]): \par Moderate mid LAD stenosis, but not hemodynamically significant per dPR\par Mod in-stent restenosis of prox RCA, but no hemodynamically significant per dPR

## 2022-05-23 ENCOUNTER — APPOINTMENT (OUTPATIENT)
Dept: CARDIOLOGY | Facility: CLINIC | Age: 82
End: 2022-05-23

## 2022-06-01 ENCOUNTER — APPOINTMENT (OUTPATIENT)
Dept: CARDIOLOGY | Facility: CLINIC | Age: 82
End: 2022-06-01

## 2022-06-07 ENCOUNTER — APPOINTMENT (OUTPATIENT)
Dept: CARDIOLOGY | Facility: CLINIC | Age: 82
End: 2022-06-07
Payer: MEDICARE

## 2022-06-07 ENCOUNTER — RESULT CHARGE (OUTPATIENT)
Age: 82
End: 2022-06-07

## 2022-06-07 LAB — INR PPP: 3.3 RATIO

## 2022-06-07 PROCEDURE — 85610 PROTHROMBIN TIME: CPT | Mod: QW

## 2022-06-09 ENCOUNTER — RX CHANGE (OUTPATIENT)
Age: 82
End: 2022-06-09

## 2022-06-24 ENCOUNTER — APPOINTMENT (OUTPATIENT)
Dept: CARDIOLOGY | Facility: CLINIC | Age: 82
End: 2022-06-24
Payer: MEDICARE

## 2022-06-24 LAB — INR PPP: 2.9 RATIO

## 2022-06-24 PROCEDURE — 85610 PROTHROMBIN TIME: CPT | Mod: QW

## 2022-08-10 ENCOUNTER — NON-APPOINTMENT (OUTPATIENT)
Age: 82
End: 2022-08-10

## 2022-08-10 ENCOUNTER — APPOINTMENT (OUTPATIENT)
Dept: CARDIOLOGY | Facility: CLINIC | Age: 82
End: 2022-08-10

## 2022-08-10 VITALS
DIASTOLIC BLOOD PRESSURE: 60 MMHG | BODY MASS INDEX: 44.32 KG/M2 | RESPIRATION RATE: 12 BRPM | SYSTOLIC BLOOD PRESSURE: 124 MMHG | WEIGHT: 266 LBS | OXYGEN SATURATION: 98 % | HEIGHT: 65 IN | HEART RATE: 87 BPM

## 2022-08-10 LAB — INR PPP: 2.6 RATIO

## 2022-08-10 PROCEDURE — 93000 ELECTROCARDIOGRAM COMPLETE: CPT

## 2022-08-10 PROCEDURE — 99214 OFFICE O/P EST MOD 30 MIN: CPT

## 2022-08-10 PROCEDURE — 85610 PROTHROMBIN TIME: CPT | Mod: QW

## 2022-08-10 NOTE — CARDIOLOGY SUMMARY
[de-identified] : \par 8/10/22 ECG: Atrial fibrillation, rate 62 bpm, poor R wave progression\par 5/2/22 ECG: Atrial fibrillation, rate 73 bpm, low voltage QRS, poor R wave progression\par 1/28/22 ECG: Atrial fibrillation, rate 69 bpm, poor R wave progression\par  [de-identified] : \par 7/28/21 Lexiscan (at [facility]): Normal rest and stress perfusion imaging. LVEF 72%.\par  [de-identified] : \par 7/28/21 Echo (at [facility]): TDS. Normal LV size and systolic function, LVEF 65%. Mild LAE. Trace MR/TR/WY. PASP 25-30 mmHg.\par  [de-identified] : \par 2/24/21 Cardiac cath (at [facility]): \par Moderate mid LAD stenosis, but not hemodynamically significant per dPR\par Mod in-stent restenosis of prox RCA, but no hemodynamically significant per dPR

## 2022-08-10 NOTE — ASSESSMENT
[FreeTextEntry1] : 83 yo male with CAD, prior PCI of D1 2000, PCI of RCA 2012 & 2016, and paroxysmal atrial fibrillation. Patient with pedal edema over the past 3 weeks.\par Last echo on 7/28/21 (at [facility]) reported normal LV size and systolic function, LVEF 65%, mild LAE, trace MR/TR/OH, and PASP 25-30 mmHg.\par Lexiscan nuclear stress tera at [facility] on 7/28/21 reported normal rest and stress perfusion imaging. LVEF 72%.\par \par Afib remains rate controlled per ECG today.\par Will continue atenolol 50 mg po bid and diltiazem  mg po daily for afib rate control.\par  INR 2.6 today. Will continue coumadin 5 mg po daily for anticoagulation. Will repeat INR in 3-4 weeks.\par  \par Patient with intermittent dyspnea and pedal edema, which improves after several days of extra furosemide. \par Diltiazem is likely contributing to pedal edema.\par Will repeat echocardiogram to reassess LV function and structural heart disease.\par Will continue furosemide 40 mg po daily.\par \par Will continue current management of CAD with aspirin, atenolol, and rosuvastatin.\par \par BP is currently adequately controlled.\par Will continue diltiazem  mg po daily, atenolol 50 mg po daily, furosemide, and losartan 25 mg po daily.

## 2022-08-24 ENCOUNTER — APPOINTMENT (OUTPATIENT)
Dept: CARDIOLOGY | Facility: CLINIC | Age: 82
End: 2022-08-24

## 2022-08-24 LAB — INR PPP: 3.3 RATIO

## 2022-08-24 PROCEDURE — 93306 TTE W/DOPPLER COMPLETE: CPT

## 2022-08-24 PROCEDURE — 85610 PROTHROMBIN TIME: CPT | Mod: QW

## 2022-08-25 ENCOUNTER — NON-APPOINTMENT (OUTPATIENT)
Age: 82
End: 2022-08-25

## 2022-09-20 ENCOUNTER — APPOINTMENT (OUTPATIENT)
Dept: CARDIOLOGY | Facility: CLINIC | Age: 82
End: 2022-09-20

## 2022-09-21 ENCOUNTER — APPOINTMENT (OUTPATIENT)
Dept: CARDIOLOGY | Facility: CLINIC | Age: 82
End: 2022-09-21

## 2022-09-21 LAB — INR PPP: 2.1 RATIO

## 2022-09-21 PROCEDURE — 85610 PROTHROMBIN TIME: CPT | Mod: QW

## 2022-10-04 ENCOUNTER — RX RENEWAL (OUTPATIENT)
Age: 82
End: 2022-10-04

## 2022-12-21 ENCOUNTER — APPOINTMENT (OUTPATIENT)
Dept: CARDIOLOGY | Facility: CLINIC | Age: 82
End: 2022-12-21

## 2023-01-09 ENCOUNTER — APPOINTMENT (OUTPATIENT)
Dept: CARDIOLOGY | Facility: CLINIC | Age: 83
End: 2023-01-09
Payer: MEDICARE

## 2023-01-09 LAB — INR PPP: 1.6 RATIO

## 2023-01-09 PROCEDURE — 85610 PROTHROMBIN TIME: CPT | Mod: QW

## 2023-01-26 ENCOUNTER — APPOINTMENT (OUTPATIENT)
Dept: CARDIOLOGY | Facility: CLINIC | Age: 83
End: 2023-01-26

## 2023-02-02 ENCOUNTER — APPOINTMENT (OUTPATIENT)
Dept: CARDIOLOGY | Facility: CLINIC | Age: 83
End: 2023-02-02
Payer: MEDICARE

## 2023-02-02 LAB — INR PPP: 4.3 RATIO

## 2023-02-02 PROCEDURE — 85610 PROTHROMBIN TIME: CPT | Mod: QW

## 2023-02-21 ENCOUNTER — APPOINTMENT (OUTPATIENT)
Dept: CARDIOLOGY | Facility: CLINIC | Age: 83
End: 2023-02-21
Payer: MEDICARE

## 2023-02-21 LAB — INR PPP: 2.6 RATIO

## 2023-02-21 PROCEDURE — 85610 PROTHROMBIN TIME: CPT | Mod: QW

## 2023-03-15 ENCOUNTER — RX RENEWAL (OUTPATIENT)
Age: 83
End: 2023-03-15

## 2023-03-21 ENCOUNTER — APPOINTMENT (OUTPATIENT)
Dept: CARDIOLOGY | Facility: CLINIC | Age: 83
End: 2023-03-21
Payer: MEDICARE

## 2023-03-21 LAB — INR PPP: 4.4 RATIO

## 2023-03-21 PROCEDURE — 85610 PROTHROMBIN TIME: CPT | Mod: QW

## 2023-08-22 ENCOUNTER — APPOINTMENT (OUTPATIENT)
Dept: CARDIOLOGY | Facility: CLINIC | Age: 83
End: 2023-08-22
Payer: MEDICARE

## 2023-08-22 LAB — INR PPP: 3.6 RATIO

## 2023-08-22 PROCEDURE — 85610 PROTHROMBIN TIME: CPT | Mod: QW

## 2023-08-23 ENCOUNTER — NON-APPOINTMENT (OUTPATIENT)
Age: 83
End: 2023-08-23

## 2023-09-07 ENCOUNTER — APPOINTMENT (OUTPATIENT)
Dept: CARDIOLOGY | Facility: CLINIC | Age: 83
End: 2023-09-07
Payer: MEDICARE

## 2023-09-07 LAB — INR PPP: 1.6 RATIO

## 2023-09-07 PROCEDURE — 85610 PROTHROMBIN TIME: CPT | Mod: QW

## 2023-09-21 ENCOUNTER — APPOINTMENT (OUTPATIENT)
Dept: CARDIOLOGY | Facility: CLINIC | Age: 83
End: 2023-09-21

## 2023-09-25 ENCOUNTER — APPOINTMENT (OUTPATIENT)
Dept: CARDIOLOGY | Facility: CLINIC | Age: 83
End: 2023-09-25
Payer: MEDICARE

## 2023-09-25 LAB — INR PPP: 2.3 RATIO

## 2023-09-25 PROCEDURE — 85610 PROTHROMBIN TIME: CPT | Mod: QW

## 2023-10-26 ENCOUNTER — APPOINTMENT (OUTPATIENT)
Dept: CARDIOLOGY | Facility: CLINIC | Age: 83
End: 2023-10-26
Payer: MEDICARE

## 2023-10-26 VITALS
SYSTOLIC BLOOD PRESSURE: 106 MMHG | WEIGHT: 264 LBS | HEART RATE: 69 BPM | HEIGHT: 65 IN | OXYGEN SATURATION: 97 % | DIASTOLIC BLOOD PRESSURE: 50 MMHG | BODY MASS INDEX: 43.99 KG/M2

## 2023-10-26 DIAGNOSIS — Z87.898 PERSONAL HISTORY OF OTHER SPECIFIED CONDITIONS: ICD-10-CM

## 2023-10-26 LAB — INR PPP: 1.2 RATIO

## 2023-10-26 PROCEDURE — 99214 OFFICE O/P EST MOD 30 MIN: CPT | Mod: 25

## 2023-10-26 PROCEDURE — 85610 PROTHROMBIN TIME: CPT | Mod: QW

## 2023-10-26 PROCEDURE — 93000 ELECTROCARDIOGRAM COMPLETE: CPT

## 2023-10-26 RX ORDER — PNV NO.95/FERROUS FUM/FOLIC AC 28MG-0.8MG
TABLET ORAL
Refills: 0 | Status: ACTIVE | COMMUNITY

## 2023-10-27 ENCOUNTER — NON-APPOINTMENT (OUTPATIENT)
Age: 83
End: 2023-10-27

## 2023-11-02 ENCOUNTER — APPOINTMENT (OUTPATIENT)
Dept: CARDIOLOGY | Facility: CLINIC | Age: 83
End: 2023-11-02

## 2023-12-04 ENCOUNTER — APPOINTMENT (OUTPATIENT)
Dept: CARDIOLOGY | Facility: CLINIC | Age: 83
End: 2023-12-04
Payer: MEDICARE

## 2023-12-04 VITALS
RESPIRATION RATE: 14 BRPM | HEIGHT: 65 IN | SYSTOLIC BLOOD PRESSURE: 114 MMHG | DIASTOLIC BLOOD PRESSURE: 70 MMHG | BODY MASS INDEX: 41.65 KG/M2 | OXYGEN SATURATION: 99 % | HEART RATE: 86 BPM | WEIGHT: 250 LBS

## 2023-12-04 PROCEDURE — 99214 OFFICE O/P EST MOD 30 MIN: CPT

## 2023-12-04 RX ORDER — LIRAGLUTIDE 6 MG/ML
18 INJECTION SUBCUTANEOUS
Refills: 0 | Status: ACTIVE | COMMUNITY

## 2023-12-04 RX ORDER — WARFARIN SODIUM 5 MG/1
5 TABLET ORAL
Qty: 90 | Refills: 3 | Status: DISCONTINUED | COMMUNITY
Start: 2021-04-06 | End: 2023-12-04

## 2023-12-04 RX ORDER — TESTOSTERONE 4 MG/D
4 PATCH TRANSDERMAL
Refills: 0 | Status: ACTIVE | COMMUNITY

## 2024-01-08 ENCOUNTER — APPOINTMENT (OUTPATIENT)
Dept: CARDIOLOGY | Facility: CLINIC | Age: 84
End: 2024-01-08
Payer: MEDICARE

## 2024-01-08 VITALS
OXYGEN SATURATION: 100 % | RESPIRATION RATE: 14 BRPM | HEIGHT: 65 IN | BODY MASS INDEX: 42.99 KG/M2 | WEIGHT: 258 LBS | DIASTOLIC BLOOD PRESSURE: 61 MMHG | SYSTOLIC BLOOD PRESSURE: 114 MMHG | HEART RATE: 76 BPM

## 2024-01-08 VITALS
OXYGEN SATURATION: 100 % | BODY MASS INDEX: 42.99 KG/M2 | HEART RATE: 76 BPM | DIASTOLIC BLOOD PRESSURE: 61 MMHG | HEIGHT: 65 IN | WEIGHT: 258 LBS | SYSTOLIC BLOOD PRESSURE: 114 MMHG

## 2024-01-08 DIAGNOSIS — R60.0 LOCALIZED EDEMA: ICD-10-CM

## 2024-01-08 PROCEDURE — 93000 ELECTROCARDIOGRAM COMPLETE: CPT

## 2024-01-08 PROCEDURE — 99214 OFFICE O/P EST MOD 30 MIN: CPT

## 2024-01-09 ENCOUNTER — NON-APPOINTMENT (OUTPATIENT)
Age: 84
End: 2024-01-09

## 2024-02-09 NOTE — HISTORY OF PRESENT ILLNESS
[FreeTextEntry1] : 81 yo male with CAD, prior PCI of D1 2000, PCI of RCA 2012 & 2016, and paroxysmal atrial fibrillation. Patient presents today for follow-up. He reports that he will occasionally develop mild pedal edema and SOB, for which he reports taking an extra dose of furosemide for several days with resolution of his symptoms. Patient denies chest pain, palpitations, syncope, melena, hematochezia, or hematemesis. He reports that his nephrologist added losartan 25 mg po daily to his regimen. 3 = A little assistance

## 2024-03-27 RX ORDER — CLOPIDOGREL BISULFATE 75 MG/1
75 TABLET, FILM COATED ORAL DAILY
Qty: 90 | Refills: 3 | Status: ACTIVE | COMMUNITY

## 2024-03-27 RX ORDER — FUROSEMIDE 40 MG/1
40 TABLET ORAL
Qty: 90 | Refills: 3 | Status: ACTIVE | COMMUNITY

## 2024-03-27 RX ORDER — ROSUVASTATIN CALCIUM 40 MG/1
40 TABLET, FILM COATED ORAL
Qty: 90 | Refills: 3 | Status: ACTIVE | COMMUNITY

## 2024-03-27 RX ORDER — ATENOLOL 50 MG/1
50 TABLET ORAL
Qty: 180 | Refills: 3 | Status: ACTIVE | COMMUNITY
Start: 2021-11-12

## 2024-04-08 ENCOUNTER — APPOINTMENT (OUTPATIENT)
Dept: CARDIOLOGY | Facility: CLINIC | Age: 84
End: 2024-04-08

## 2024-05-02 RX ORDER — ALFUZOSIN HYDROCHLORIDE 10 MG/1
10 TABLET, EXTENDED RELEASE ORAL
Qty: 90 | Refills: 3 | Status: ACTIVE | COMMUNITY
Start: 2023-03-15 | End: 1900-01-01

## 2024-05-02 RX ORDER — METFORMIN HYDROCHLORIDE 1000 MG/1
1000 TABLET, COATED ORAL TWICE DAILY
Qty: 180 | Refills: 3 | Status: ACTIVE | COMMUNITY
Start: 2021-06-01 | End: 1900-01-01

## 2024-05-09 ENCOUNTER — APPOINTMENT (OUTPATIENT)
Dept: CARDIOLOGY | Facility: CLINIC | Age: 84
End: 2024-05-09
Payer: MEDICARE

## 2024-05-09 ENCOUNTER — NON-APPOINTMENT (OUTPATIENT)
Age: 84
End: 2024-05-09

## 2024-05-09 VITALS
HEART RATE: 61 BPM | HEIGHT: 65 IN | BODY MASS INDEX: 40.98 KG/M2 | OXYGEN SATURATION: 99 % | DIASTOLIC BLOOD PRESSURE: 61 MMHG | SYSTOLIC BLOOD PRESSURE: 113 MMHG | WEIGHT: 246 LBS | RESPIRATION RATE: 14 BRPM

## 2024-05-09 DIAGNOSIS — E78.5 HYPERLIPIDEMIA, UNSPECIFIED: ICD-10-CM

## 2024-05-09 DIAGNOSIS — I50.30 UNSPECIFIED DIASTOLIC (CONGESTIVE) HEART FAILURE: ICD-10-CM

## 2024-05-09 DIAGNOSIS — I48.91 UNSPECIFIED ATRIAL FIBRILLATION: ICD-10-CM

## 2024-05-09 DIAGNOSIS — I10 ESSENTIAL (PRIMARY) HYPERTENSION: ICD-10-CM

## 2024-05-09 DIAGNOSIS — I25.10 ATHEROSCLEROTIC HEART DISEASE OF NATIVE CORONARY ARTERY W/OUT ANGINA PECTORIS: ICD-10-CM

## 2024-05-09 PROCEDURE — 93000 ELECTROCARDIOGRAM COMPLETE: CPT

## 2024-05-09 PROCEDURE — 99214 OFFICE O/P EST MOD 30 MIN: CPT

## 2024-05-09 PROCEDURE — G2211 COMPLEX E/M VISIT ADD ON: CPT

## 2024-05-10 PROBLEM — I48.91 ATRIAL FIBRILLATION: Status: ACTIVE | Noted: 2018-05-18

## 2024-05-10 PROBLEM — I50.30 HEART FAILURE WITH PRESERVED LEFT VENTRICULAR FUNCTION (HFPEF): Status: ACTIVE | Noted: 2023-12-04

## 2024-05-10 PROBLEM — I25.10 CAD (CORONARY ATHEROSCLEROTIC DISEASE): Status: ACTIVE | Noted: 2019-07-01

## 2024-05-10 PROBLEM — E78.5 HYPERLIPIDEMIA: Status: ACTIVE | Noted: 2021-03-03

## 2024-05-10 PROBLEM — I10 HYPERTENSION: Status: ACTIVE | Noted: 2019-07-26

## 2024-05-10 RX ORDER — PANTOPRAZOLE 40 MG/1
40 TABLET, DELAYED RELEASE ORAL
Qty: 90 | Refills: 1 | Status: ACTIVE | COMMUNITY
Start: 2023-04-02

## 2024-05-10 NOTE — ASSESSMENT
[FreeTextEntry1] : 83 yo male with CAD, prior PCI of D1 2000, PCI of RCA 2012 & 2016, PCI of LAD w/ Resolute Pierre stent 11/20/23 (at [facility]), paroxysmal atrial fibrillation, and HFpEF.  Patient reports exertional SOB, but denies CP. Labs from 4/5/24 reported Hgb 11. Will perform echocardiogram to assess LV function and structural heart disease. After review of echo results, will determine if further cardiac work-up or intervention is clinically indicated. Will continue aspirin 81 mg po daily, clopidogrel 75 mg po daily, atenolol, furosemide, and rosuvastatin 40 mg po daily. Cardiac cath report from 11/2023 has been again requested for my review.  ECG today demonstrated rate controlled atrial fibrillation. Will continue atenolol 50 mg po bid for afib rate control. Cr 1.6 per 4/5/24 labs (Cr clearance ~ 55 ml/min). Will continue Xarelto 20 mg po daily for thromboembolic prophylaxis.  BP is currently adequately controlled. Will continue atenolol 50 mg po bid.

## 2024-05-10 NOTE — CARDIOLOGY SUMMARY
[de-identified] : 5/9/24 ECG: Atrial fibrillation, rate 73 bpm 1/8/24 ECG: Atrial fibrillation, rate 76 bpm, low voltage QRS 10/26/23 ECG: Atrial fibrillation, rate 92 bpm, low voltage QRS [de-identified] : 7/28/21 Lexiscan (at [facility]): Normal rest and stress perfusion imaging. LVEF 72%. [de-identified] : 8/24/22 Echo: Normal LV size and systolic function, LVEF 66%. Mild LVH. Mildly dilated LA. Trace MR. PASP 35 mmHg. 7/28/21 Echo (at [facility]): TDS. Normal LV size and systolic function, LVEF 65%. Mild LAE. Trace MR/TR/ND. PASP 25-30 mmHg. [de-identified] : 2/24/21 Cardiac cath (at [facility]): Moderate mid LAD stenosis, but not hemodynamically significant per dPR Mod in-stent restenosis of prox RCA, but no hemodynamically significant per dPR.

## 2024-05-10 NOTE — HISTORY OF PRESENT ILLNESS
[FreeTextEntry1] : 83 yo male with CAD, prior PCI of D1 2000, PCI of RCA 2012 & 2016, PCI of LAD w/ Resolute Pierre stent 11/20/23 (at [facility]), paroxysmal atrial fibrillation, and HFpEF. Patient denies chest pain, but reports continued exertional SOB since his reported PCI in 11/2023. He also reports that he has requested the cath report to be sent to my office in past, but I have yet to receive the report for review. He denies palpitations, syncope, edema, melena, hematochezia, or hematemesis.

## 2024-05-10 NOTE — PHYSICAL EXAM
[No Acute Distress] : no acute distress [Obese] : obese [Normal Conjunctiva] : normal conjunctiva [Normal Venous Pressure] : normal venous pressure [Normal Rate] : normal [Irregularly Irregular] : irregularly irregular [Normal S1] : normal S1 [Normal S2] : normal S2 [No Murmur] : no murmurs heard [No Pitting Edema] : no pitting edema present [Rales / Crackles Right Base] : crackles were heard over the right base [Moves all extremities] : moves all extremities [No Focal Deficits] : no focal deficits [Normal Speech] : normal speech [Alert and Oriented] : alert and oriented [Normal memory] : normal memory [S3] : no S3 [S4] : no S4

## 2024-05-10 NOTE — REVIEW OF SYSTEMS
[Dyspnea on exertion] : dyspnea during exertion [Negative] : Heme/Lymph [Headache] : no headache [Chest Discomfort] : no chest discomfort [Lower Ext Edema] : no extremity edema [Palpitations] : no palpitations [Syncope] : no syncope

## 2024-05-16 RX ORDER — RIVAROXABAN 20 MG/1
20 TABLET, FILM COATED ORAL
Qty: 90 | Refills: 3 | Status: ACTIVE | COMMUNITY
Start: 1900-01-01 | End: 1900-01-01

## 2024-06-13 ENCOUNTER — APPOINTMENT (OUTPATIENT)
Dept: CARDIOLOGY | Facility: CLINIC | Age: 84
End: 2024-06-13
Payer: MEDICARE

## 2024-06-13 DIAGNOSIS — R06.02 SHORTNESS OF BREATH: ICD-10-CM

## 2024-06-13 PROCEDURE — 93306 TTE W/DOPPLER COMPLETE: CPT

## 2024-06-14 PROBLEM — R06.02 EXERTIONAL SHORTNESS OF BREATH: Status: ACTIVE | Noted: 2024-05-09

## 2024-11-04 ENCOUNTER — RX RENEWAL (OUTPATIENT)
Age: 84
End: 2024-11-04

## 2025-03-24 ENCOUNTER — APPOINTMENT (OUTPATIENT)
Dept: CARDIOLOGY | Facility: CLINIC | Age: 85
End: 2025-03-24

## 2025-04-09 ENCOUNTER — RX RENEWAL (OUTPATIENT)
Age: 85
End: 2025-04-09

## 2025-04-18 ENCOUNTER — RX RENEWAL (OUTPATIENT)
Age: 85
End: 2025-04-18

## 2025-04-24 ENCOUNTER — NON-APPOINTMENT (OUTPATIENT)
Age: 85
End: 2025-04-24

## 2025-04-24 ENCOUNTER — APPOINTMENT (OUTPATIENT)
Dept: CARDIOLOGY | Facility: CLINIC | Age: 85
End: 2025-04-24
Payer: MEDICARE

## 2025-04-24 VITALS
SYSTOLIC BLOOD PRESSURE: 112 MMHG | WEIGHT: 246 LBS | HEART RATE: 87 BPM | BODY MASS INDEX: 40.94 KG/M2 | DIASTOLIC BLOOD PRESSURE: 64 MMHG | OXYGEN SATURATION: 99 %

## 2025-04-24 DIAGNOSIS — I10 ESSENTIAL (PRIMARY) HYPERTENSION: ICD-10-CM

## 2025-04-24 DIAGNOSIS — I50.30 UNSPECIFIED DIASTOLIC (CONGESTIVE) HEART FAILURE: ICD-10-CM

## 2025-04-24 DIAGNOSIS — E78.5 HYPERLIPIDEMIA, UNSPECIFIED: ICD-10-CM

## 2025-04-24 DIAGNOSIS — I25.10 ATHEROSCLEROTIC HEART DISEASE OF NATIVE CORONARY ARTERY W/OUT ANGINA PECTORIS: ICD-10-CM

## 2025-04-24 DIAGNOSIS — Z01.810 ENCOUNTER FOR PREPROCEDURAL CARDIOVASCULAR EXAMINATION: ICD-10-CM

## 2025-04-24 DIAGNOSIS — I48.91 UNSPECIFIED ATRIAL FIBRILLATION: ICD-10-CM

## 2025-04-24 PROCEDURE — G2211 COMPLEX E/M VISIT ADD ON: CPT

## 2025-04-24 PROCEDURE — 93000 ELECTROCARDIOGRAM COMPLETE: CPT | Mod: NC

## 2025-04-24 PROCEDURE — 99214 OFFICE O/P EST MOD 30 MIN: CPT

## 2025-05-01 ENCOUNTER — RX RENEWAL (OUTPATIENT)
Age: 85
End: 2025-05-01

## 2025-05-16 ENCOUNTER — RX RENEWAL (OUTPATIENT)
Age: 85
End: 2025-05-16